# Patient Record
Sex: MALE | Race: WHITE | NOT HISPANIC OR LATINO | Employment: FULL TIME | URBAN - METROPOLITAN AREA
[De-identification: names, ages, dates, MRNs, and addresses within clinical notes are randomized per-mention and may not be internally consistent; named-entity substitution may affect disease eponyms.]

---

## 2017-01-19 DIAGNOSIS — E78.5 HYPERLIPIDEMIA LDL GOAL <130: Primary | ICD-10-CM

## 2017-01-19 RX ORDER — SIMVASTATIN 20 MG
20 TABLET ORAL AT BEDTIME
Qty: 90 TABLET | Refills: 0 | Status: SHIPPED | OUTPATIENT
Start: 2017-01-19 | End: 2017-01-28

## 2017-01-19 NOTE — TELEPHONE ENCOUNTER
simvastatin (ZOCOR) 20 MG tablet    Last Written Prescription Date: 12/09/15  Last Fill Quantity: 90, 10/01/16 # refills: 3    Last Office Visit with [facility] or [facility] prescribing provider:  Dr. Alegre   Future Office Visit:  Nothing schedule as of this date    CHOLESTEROL   Date Value Ref Range Status   12/14/2015 186 <200 mg/dL Final     HDL CHOLESTEROL   Date Value Ref Range Status   12/14/2015 54 >39 mg/dL Final     LDL CHOLESTEROL CALCULATED   Date Value Ref Range Status   12/14/2015 95 <100 mg/dL Final     Comment:     Desirable:       <100 mg/dl     TRIGLYCERIDES   Date Value Ref Range Status   12/14/2015 186* <150 mg/dL Final     Comment:     Borderline high:  150-199 mg/dl   High:             200-499 mg/dl   Very high:       >499 mg/dl   Fasting specimen       CHOLESTEROL/HDL RATIO   Date Value Ref Range Status   10/02/2014 2.9 0.0 - 5.0 Final     ALT   Date Value Ref Range Status   12/14/2015 38 0 - 70 U/L Final

## 2017-01-19 NOTE — Clinical Note
[facility]  [address] [place], [address]  [phone]        January 19, 2017    Ezekiel Lee                                                                                                                                     [address] [facility] [address]            We recently received a call from your pharmacy requesting a refill of Simvastatin.     A review of your chart indicates that an appointment is required with your provider for yearly physical and fasting labs at visit and med refill-was due 12/9/16. Please call the clinic at [phone] to schedule your appointment.     Taking care of your health is important to us and ongoing visits with your provider are vital to your care.  We look forward to seeing you in the near future.     Sincerely,     [facility]

## 2017-01-28 ENCOUNTER — OFFICE VISIT (OUTPATIENT)
Dept: FAMILY MEDICINE | Facility: CLINIC | Age: 42
End: 2017-01-28
Payer: COMMERCIAL

## 2017-01-28 VITALS
DIASTOLIC BLOOD PRESSURE: 66 MMHG | HEART RATE: 65 BPM | SYSTOLIC BLOOD PRESSURE: 118 MMHG | TEMPERATURE: 98.1 F | WEIGHT: 180.4 LBS | HEIGHT: 69 IN | BODY MASS INDEX: 26.72 KG/M2 | RESPIRATION RATE: 14 BRPM

## 2017-01-28 DIAGNOSIS — R19.7 DIARRHEA, UNSPECIFIED TYPE: ICD-10-CM

## 2017-01-28 DIAGNOSIS — G47.00 INSOMNIA, UNSPECIFIED: ICD-10-CM

## 2017-01-28 DIAGNOSIS — Z83.3 FAMILY HISTORY OF DIABETES MELLITUS: ICD-10-CM

## 2017-01-28 DIAGNOSIS — Z83.49 FAMILY HISTORY OF THYROID DISEASE: ICD-10-CM

## 2017-01-28 DIAGNOSIS — R68.82 DECREASED SEX DRIVE: ICD-10-CM

## 2017-01-28 DIAGNOSIS — G25.81 RESTLESS LEGS SYNDROME (RLS): ICD-10-CM

## 2017-01-28 DIAGNOSIS — K21.9 GASTROESOPHAGEAL REFLUX DISEASE WITHOUT ESOPHAGITIS: ICD-10-CM

## 2017-01-28 DIAGNOSIS — E78.5 HYPERLIPIDEMIA LDL GOAL <130: ICD-10-CM

## 2017-01-28 DIAGNOSIS — Z72.51 HIGH RISK SEXUAL BEHAVIOR: ICD-10-CM

## 2017-01-28 DIAGNOSIS — Z00.00 ROUTINE HISTORY AND PHYSICAL EXAMINATION OF ADULT: Primary | ICD-10-CM

## 2017-01-28 PROCEDURE — 87591 N.GONORRHOEAE DNA AMP PROB: CPT | Performed by: FAMILY MEDICINE

## 2017-01-28 PROCEDURE — 99213 OFFICE O/P EST LOW 20 MIN: CPT | Mod: 25 | Performed by: FAMILY MEDICINE

## 2017-01-28 PROCEDURE — 99396 PREV VISIT EST AGE 40-64: CPT | Performed by: FAMILY MEDICINE

## 2017-01-28 PROCEDURE — 87491 CHLMYD TRACH DNA AMP PROBE: CPT | Performed by: FAMILY MEDICINE

## 2017-01-28 RX ORDER — SIMVASTATIN 20 MG
20 TABLET ORAL AT BEDTIME
Qty: 90 TABLET | Refills: 3 | Status: SHIPPED | OUTPATIENT
Start: 2017-01-28 | End: 2018-02-26

## 2017-01-28 RX ORDER — ZOLPIDEM TARTRATE 12.5 MG/1
12.5 TABLET, FILM COATED, EXTENDED RELEASE ORAL
Qty: 90 TABLET | Refills: 0 | Status: SHIPPED | OUTPATIENT
Start: 2017-01-28 | End: 2018-02-26

## 2017-01-28 RX ORDER — EMTRICITABINE AND TENOFOVIR DISOPROXIL FUMARATE 200; 300 MG/1; MG/1
1 TABLET, FILM COATED ORAL DAILY
Qty: 30 TABLET | Refills: 3 | Status: SHIPPED | OUTPATIENT
Start: 2017-01-28 | End: 2017-12-15

## 2017-01-28 RX ORDER — ZOLPIDEM TARTRATE 10 MG/1
10 TABLET ORAL
Qty: 90 TABLET | Refills: 0 | Status: SHIPPED | OUTPATIENT
Start: 2017-01-28 | End: 2018-02-26

## 2017-01-28 RX ORDER — SIMVASTATIN 20 MG
20 TABLET ORAL AT BEDTIME
Qty: 90 TABLET | Refills: 0 | Status: SHIPPED | OUTPATIENT
Start: 2017-01-28 | End: 2017-01-28

## 2017-01-28 RX ORDER — DIPHENOXYLATE HCL/ATROPINE 2.5-.025MG
2 TABLET ORAL 4 TIMES DAILY PRN
Qty: 30 TABLET | Refills: 0 | Status: SHIPPED | OUTPATIENT
Start: 2017-01-28 | End: 2017-05-31

## 2017-01-28 NOTE — PROGRESS NOTES
SUBJECTIVE:     CC: Ezekiel Lee is an 41 year old male who presents for preventative health visit.     Physical  Annual:     Getting at least 3 servings of Calcium per day::  Yes    Bi-annual eye exam::  Yes    Dental care twice a year::  NO    Sleep apnea or symptoms of sleep apnea::  None    Diet::  Regular (no restrictions)    Frequency of exercise::  4-5 days/week    Duration of exercise::  45-60 minutes    Taking medications regularly::  Yes    Medication side effects::  None, No muscle aches and No significant flushing    Additional concerns today::  YES              Today's PHQ-2 Score:   PHQ-2 ( 1999 [employer]) 1/25/2017   Q1: Little interest or pleasure in doing things -   Q2: Feeling down, depressed or hopeless -   PHQ-2 Score -   Little interest or pleasure in doing things Not at all   Feeling down, depressed or hopeless Not at all   PHQ-2 Score 0       Abuse: Current or Past(Physical, Sexual or Emotional)- No  Do you feel safe in your environment - Yes    Social History   Substance Use Topics     Smoking status: Never Smoker      Smokeless tobacco: Never Used     Alcohol Use: Yes      Comment: RARELY     Rarely    Last PSA: No results found for: PSA    Recent Labs   Lab Test  12/14/15   0832  10/02/14   0821  01/23/12   0918   CHOL  186  141  178   HDL  54  49  56   LDL  95  70  96   TRIG  186*  109  129   CHOLHDLRATIO   --   2.9  3.2   NHDL  132*   --    --        Reviewed orders with patient. Reviewed health maintenance and updated orders accordingly - Yes    All Histories reviewed and updated in Epic.      ROS:  C: NEGATIVE for fever, chills, change in weight  I: NEGATIVE for worrisome rashes, moles or lesions  E: NEGATIVE for vision changes or irritation  ENT: NEGATIVE for ear, mouth and throat problems  R: NEGATIVE for significant cough or SOB  CV: NEGATIVE for chest pain, palpitations or peripheral edema  GI: POSITIVE for gas or bloating and diarrhea bid for the last week - loose and sore bottom -  pepto has not helped   male: negative for dysuria, hematuria, decreased urinary stream, erectile dysfunction, urethral discharge  M: NEGATIVE for significant arthralgias or myalgia  N: NEGATIVE for weakness, dizziness or paresthesias  P: NEGATIVE for changes in mood or affect    SUBJECTIVE:    CC: Ezekiel Lee is a 41 year old male who presents for a physical exam.    HPI: He has no concerns at this time.  His cholesterol has been checked and in the past it was good 14 months ago on his meds.  He has never had colonoscopy in the past.  He has had diarrhea for the last week at least 2 times per day.   It is loose and his bottom is getting sore.   He tried pepto and it is not helping.   He is also noting that his sex drive is decreased.   He is getting  soon and he only is interested about once per week where his partner would like to every day.   He and his partner have an open relationship and he is interested in truvada for HIV prevention.   He would like to price this out and see what the risks are and how often he would need testing.       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     NONSPECIFIC MEDICAL HISTORY 1994     fractured mandible and had it wired shut     Detached retina, right 2012     back corner - no visual loss - found on eye exam       Past Surgical History   Procedure Laterality Date     Surgical history of -   1994     fractured mandible     Surgical history of -   4/2013     left eye lazer surgery for detached retina       Current Outpatient Prescriptions   Medication Sig Dispense Refill     zolpidem (AMBIEN) 10 MG tablet Take 1 tablet (10 mg) by mouth nightly as needed for sleep 90 tablet 0     omeprazole (PRILOSEC) 20 MG CR capsule Take 2 capsules (40 mg) by mouth daily 180 capsule 3     zolpidem (AMBIEN CR) 12.5 MG CR tablet Take 1 tablet (12.5 mg) by mouth nightly as needed 90  "tablet 0     emtricitabine-tenofovir (TRUVADA) 200-300 MG per tablet Take 1 tablet by mouth daily 30 tablet 3     diphenoxylate-atropine (LOMOTIL) 2.5-0.025 MG per tablet Take 2 tablets by mouth 4 times daily as needed for diarrhea 30 tablet 0     simvastatin (ZOCOR) 20 MG tablet Take 1 tablet (20 mg) by mouth At Bedtime at bedtime. 90 tablet 3     Multiple Vitamins-Minerals (MULTIVITAMIN & MINERAL PO)        rOPINIRole (REQUIP) 0.5 MG tablet Take 2 tablets (1 mg) by mouth nightly as needed 90 tablet 1     [DISCONTINUED] simvastatin (ZOCOR) 20 MG tablet Take 1 tablet (20 mg) by mouth At Bedtime at bedtime. 90 tablet 0     [DISCONTINUED] simvastatin (ZOCOR) 20 MG tablet Take 1 tablet (20 mg) by mouth At Bedtime at bedtime. 90 tablet 0       Allergies   Allergen Reactions     No Known Drug Allergies        Social History   Substance Use Topics     Smoking status: Never Smoker      Smokeless tobacco: Never Used     Alcohol Use: Yes      Comment: RARELY       Family History   Problem Relation Age of Onset     Lipids Mother      Lipids Father      Hypertension Maternal Grandmother      Hypertension Maternal Grandfather      C.A.D. Paternal Grandfather      in his 60's     DIABETES Maternal Grandfather      DIABETES Paternal Grandfather      Neurologic Disorder Mother      multiple sclerosis     Genitourinary Problems Father      kidney stone     Thyroid Disease Father      hyperthyroidism     GASTROINTESTINAL DISEASE Maternal Grandmother      diverticulitis                      EXAM:  /66 mmHg  Pulse 65  Temp(Src) 98.1  F (36.7  C) (Oral)  Resp 14  Ht 5' 9\" (1.753 m)  Wt 180 lb 6.4 oz (81.829 kg)  BMI 26.63 kg/m2  GENERAL APPEARANCE: healthy, alert and no distress  EYES: Eyes grossly normal to inspection, fundi benign-no diabetic or hypertensive changes seen and PERRL  HENT: ear canals and TM's normal and nose and mouth without ulcers or lesions  NECK: no adenopathy, no asymmetry, masses, or scars and thyroid " normal to palpation  RESP: lungs clear to auscultation - no rales, rhonchi or wheezes  CV: regular rates and rhythm, normal S1 S2, no S3 or S4 and no murmur, click or rub -  ABDOMEN: soft, nontender, without hepatosplenomegaly or masses and bowel sounds normal  : male: testicles normal without atrophy or masses, no hernias and penis normal without urethral discharge  Rectal/prostate: Not done  MS: extremities normal- no gross deformities noted  SKIN: no suspicious lesions or rashes  NEURO: Normal strength and tone, sensory exam grossly normal, mentation intact and speech normal  PSYCH: mentation appears normal. and affect normal/bright  LYMPHATICS:  No lymphadenopathy  EXTREMITIES: No peripheral edema, clubbing or cyanosis      Assessment:  1. Healthy 41 year old male here for healthcare maintainence issues  2. Diarrhea times one week - likely viral  3. High risk sex - man who has sex with men  4. Low sex drive  5. High cholesterol - under good control  6. gastroesophageal reflux disease - under good control  7. Insomnia with  restless leg syndrome  - under good control  8. Family hx of diabetes and thyroid disease      PLAN:  1. See [facility] orders for labs  2. Will try lomotil  3. The potential side effects of the prescription medication were discussed with the patient.  4. If still diarrhea in one week will contact me and will do stool cx  5. Printed rx for truvada - discussed side effects - will price out - need HIV test now and every 3 months with other STI testing too  6. Refills per [facility]  7.  BMP every 6 months while on truvada  8. Liver once yearly due to statin  9. Recheck with me in 6 months and labs in 3 months            I have discussed with patient the risks, benefits, medications, treatment options and modalities.       Coty Roberson MD  [facility]  Answers for HPI/ROS submitted by the patient on 1/25/2017   PHQ-2 Depressed: Not at all, Not at all  PHQ-2 Score: 0

## 2017-01-28 NOTE — MR AVS SNAPSHOT
After Visit Summary   1/28/2017    Ezekiel Lee    MRN: [id number]           Patient Information     Date Of Birth          1975        Visit Information        Provider Department      1/28/2017 11:30 AM Coty Roberson MD [facility]        Today's Diagnoses     Routine history and physical examination of adult    -  1     Hyperlipidemia LDL goal <130         Insomnia, unspecified         Restless legs syndrome (RLS)         Gastroesophageal reflux disease without esophagitis         Family history of diabetes mellitus         Family history of thyroid disease         Decreased sex drive         High risk sexual behavior         Diarrhea, unspecified type            Follow-ups after your visit        Future tests that were ordered for you today     Open Standing Orders        Priority Remaining Interval Expires Ordered    HIV Antigen Antibody Combo Routine 4/4 every 3 months 1/28/2018 1/28/2017    Anti Treponema Routine 4/4 every 3 months 1/28/2018 1/28/2017    NEISSERIA GONORRHOEA PCR Routine 4/4 every 3 months 1/28/2018 1/28/2017    CHLAMYDIA TRACHOMATIS PCR Routine 4/4 every 3 months 1/28/2018 1/28/2017    Hepatitis B surface antigen Routine 4/4 every 3 months 1/28/2018 1/28/2017          Open Future Orders        Priority Expected Expires Ordered    HIV Antigen Antibody Combo Routine 2/3/2017 4/27/2017 1/28/2017    Hepatitis B surface antigen Routine 2/3/2017 4/27/2017 1/28/2017    Anti Treponema Routine 2/3/2017 4/27/2017 1/28/2017    Lipid Profile with reflex to direct LDL Routine 2/7/2017 1/22/2018 1/28/2017    Comprehensive metabolic panel Routine 2/7/2017 1/22/2018 1/28/2017    Hemoglobin A1c Routine 2/7/2017 1/22/2018 1/28/2017    TSH with free T4 reflex Routine 2/7/2017 1/22/2018 1/28/2017    Testosterone Free and Total Routine 2/7/2017 1/22/2018 1/28/2017            Who to contact     If you have questions or need follow up information about today's clinic visit or  "your schedule please contact [facility] directly at [phone].  Normal or non-critical lab and imaging results will be communicated to you by MyChart, letter or phone within 4 business days after the clinic has received the results. If you do not hear from us within 7 days, please contact the clinic through [employer] or phone. If you have a critical or abnormal lab result, we will notify you by phone as soon as possible.  Submit refill requests through [employer] or call your pharmacy and they will forward the refill request to us. Please allow 3 business days for your refill to be completed.          Additional Information About Your Visit        [employer] Information     [employer] gives you secure access to your electronic health record. If you see a primary care provider, you can also send messages to your care team and make appointments. If you have questions, please call your primary care clinic.  If you do not have a primary care provider, please call [phone] and they will assist you.        Care EveryWhere ID     This is your Care EveryWhere ID. This could be used by other organizations to access your [facility] medical records  [id number]        Your Vitals Were     Pulse Temperature Respirations Height BMI (Body Mass Index)       65 98.1  F (36.7  C) (Oral) 14 5' 9\" (1.753 m) 26.63 kg/m2        Blood Pressure from Last 3 Encounters:   01/28/17 118/66   12/09/15 114/80   06/22/15 112/62    Weight from Last 3 Encounters:   01/28/17 180 lb 6.4 oz (81.829 kg)   12/09/15 172 lb 9.6 oz (78.291 kg)   06/22/15 171 lb 1.6 oz (77.61 kg)              We Performed the Following     CHLAMYDIA TRACHOMATIS PCR     NEISSERIA GONORRHOEA PCR          Today's Medication Changes          These changes are accurate as of: 1/28/17 12:14 PM.  If you have any questions, ask your nurse or doctor.               Start taking these medicines.        Dose/Directions    diphenoxylate-atropine 2.5-0.025 MG per tablet "   Commonly known as:  LOMOTIL   Used for:  Diarrhea, unspecified type   Started by:  Coty Roberson MD        Dose:  2 tablet   Take 2 tablets by mouth 4 times daily as needed for diarrhea   Quantity:  30 tablet   Refills:  0       emtricitabine-tenofovir 200-300 MG per tablet   Commonly known as:  TRUVADA   Used for:  High risk sexual behavior   Started by:  Coty Roberson MD        Dose:  1 tablet   Take 1 tablet by mouth daily   Quantity:  30 tablet   Refills:  3            Where to get your medicines      These medications were sent to [facility] - [place], [place] - [address] [facility]  [address] [facility], [place] [address]     Phone:  [phone]    - omeprazole 20 MG CR capsule  - simvastatin 20 MG tablet      Some of these will need a paper prescription and others can be bought over the counter.  Ask your nurse if you have questions.     Bring a paper prescription for each of these medications    - diphenoxylate-atropine 2.5-0.025 MG per tablet  - emtricitabine-tenofovir 200-300 MG per tablet  - zolpidem 10 MG tablet  - zolpidem 12.5 MG CR tablet             Primary Care Provider Office Phone # Fax #    Coty Roberson [phone]       [facility] [id number] [place] [address]        Thank you!     Thank you for choosing [facility]  for your care. Our goal is always to provide you with excellent care. Hearing back from our patients is one way we can continue to improve our services. Please take a few minutes to complete the written survey that you may receive in the mail after your visit with us. Thank you!             Your Updated Medication List - Protect others around you: Learn how to safely use, store and throw away your medicines at www.disposemymeds.org.          This list is accurate as of: 1/28/17 12:14 PM.  Always use your most recent med list.                   Brand Name Dispense Instructions for use     diphenoxylate-atropine 2.5-0.025 MG per tablet    LOMOTIL    30 tablet    Take 2 tablets by mouth 4 times daily as needed for diarrhea       emtricitabine-tenofovir 200-300 MG per tablet    TRUVADA    30 tablet    Take 1 tablet by mouth daily       MULTIVITAMIN & MINERAL PO          omeprazole 20 MG CR capsule    priLOSEC    180 capsule    Take 2 capsules (40 mg) by mouth daily       rOPINIRole 0.5 MG tablet    REQUIP    90 tablet    Take 2 tablets (1 mg) by mouth nightly as needed       simvastatin 20 MG tablet    ZOCOR    90 tablet    Take 1 tablet (20 mg) by mouth At Bedtime at bedtime.       * zolpidem 10 MG tablet    AMBIEN    90 tablet    Take 1 tablet (10 mg) by mouth nightly as needed for sleep       * zolpidem 12.5 MG CR tablet    AMBIEN CR    90 tablet    Take 1 tablet (12.5 mg) by mouth nightly as needed       * Notice:  This list has 2 medication(s) that are the same as other medications prescribed for you. Read the directions carefully, and ask your doctor or other care provider to review them with you.

## 2017-01-31 LAB
C TRACH DNA SPEC QL NAA+PROBE: NORMAL
N GONORRHOEA DNA SPEC QL NAA+PROBE: NORMAL
SPECIMEN SOURCE: NORMAL
SPECIMEN SOURCE: NORMAL

## 2017-02-09 DIAGNOSIS — R94.4 DECREASED GFR: Primary | ICD-10-CM

## 2017-02-09 DIAGNOSIS — R68.82 DECREASED SEX DRIVE: ICD-10-CM

## 2017-02-09 DIAGNOSIS — E78.5 HYPERLIPIDEMIA LDL GOAL <130: ICD-10-CM

## 2017-02-09 DIAGNOSIS — Z83.3 FAMILY HISTORY OF DIABETES MELLITUS: ICD-10-CM

## 2017-02-09 DIAGNOSIS — Z72.51 HIGH RISK SEXUAL BEHAVIOR: ICD-10-CM

## 2017-02-09 DIAGNOSIS — Z00.00 ROUTINE HISTORY AND PHYSICAL EXAMINATION OF ADULT: ICD-10-CM

## 2017-02-09 DIAGNOSIS — Z83.49 FAMILY HISTORY OF THYROID DISEASE: ICD-10-CM

## 2017-02-09 LAB
ALBUMIN SERPL-MCNC: 4.2 G/DL (ref 3.4–5)
ALP SERPL-CCNC: 64 U/L (ref 40–150)
ALT SERPL W P-5'-P-CCNC: 35 U/L (ref 0–70)
ANION GAP SERPL CALCULATED.3IONS-SCNC: 9 MMOL/L (ref 3–14)
AST SERPL W P-5'-P-CCNC: 18 U/L (ref 0–45)
BILIRUB SERPL-MCNC: 0.6 MG/DL (ref 0.2–1.3)
BUN SERPL-MCNC: 14 MG/DL (ref 7–30)
CALCIUM SERPL-MCNC: 9.1 MG/DL (ref 8.5–10.1)
CHLORIDE SERPL-SCNC: 104 MMOL/L (ref 94–109)
CHOLEST SERPL-MCNC: 155 MG/DL
CO2 SERPL-SCNC: 27 MMOL/L (ref 20–32)
CREAT SERPL-MCNC: 1.32 MG/DL (ref 0.66–1.25)
GFR SERPL CREATININE-BSD FRML MDRD: 60 ML/MIN/1.7M2
GLUCOSE SERPL-MCNC: 108 MG/DL (ref 70–99)
HBA1C MFR BLD: 5.2 % (ref 4.3–6)
HBV SURFACE AG SERPL QL IA: NONREACTIVE
HDLC SERPL-MCNC: 55 MG/DL
HIV 1+2 AB+HIV1 P24 AG SERPL QL IA: NORMAL
LDLC SERPL CALC-MCNC: 80 MG/DL
NONHDLC SERPL-MCNC: 100 MG/DL
POTASSIUM SERPL-SCNC: 4.2 MMOL/L (ref 3.4–5.3)
PROT SERPL-MCNC: 7.9 G/DL (ref 6.8–8.8)
SODIUM SERPL-SCNC: 140 MMOL/L (ref 133–144)
TRIGL SERPL-MCNC: 102 MG/DL
TSH SERPL DL<=0.005 MIU/L-ACNC: 0.69 MU/L (ref 0.4–4)

## 2017-02-09 PROCEDURE — 87389 HIV-1 AG W/HIV-1&-2 AB AG IA: CPT | Performed by: FAMILY MEDICINE

## 2017-02-09 PROCEDURE — 80053 COMPREHEN METABOLIC PANEL: CPT | Performed by: FAMILY MEDICINE

## 2017-02-09 PROCEDURE — 36415 COLL VENOUS BLD VENIPUNCTURE: CPT | Performed by: FAMILY MEDICINE

## 2017-02-09 PROCEDURE — 84443 ASSAY THYROID STIM HORMONE: CPT | Performed by: FAMILY MEDICINE

## 2017-02-09 PROCEDURE — 80061 LIPID PANEL: CPT | Performed by: FAMILY MEDICINE

## 2017-02-09 PROCEDURE — 84403 ASSAY OF TOTAL TESTOSTERONE: CPT | Performed by: FAMILY MEDICINE

## 2017-02-09 PROCEDURE — 83036 HEMOGLOBIN GLYCOSYLATED A1C: CPT | Performed by: FAMILY MEDICINE

## 2017-02-09 PROCEDURE — 86780 TREPONEMA PALLIDUM: CPT | Performed by: FAMILY MEDICINE

## 2017-02-09 PROCEDURE — 84270 ASSAY OF SEX HORMONE GLOBUL: CPT | Performed by: FAMILY MEDICINE

## 2017-02-09 PROCEDURE — 87340 HEPATITIS B SURFACE AG IA: CPT | Performed by: FAMILY MEDICINE

## 2017-02-09 NOTE — LETTER
[facility]  [address] [place], [place], [address]  [phone]      February 13, 2017    Ezekiel Lee  [address] [place] [address]          Dear Ezekiel,    The results of your recent tests are back. All labs are normal/negative.   One kidney test is slightly sluggish but can be affected by muscle mass and could be false value.   Come in for repeat testing in 3 months. Enclosed is a copy of the results.  It was a pleasure to see you at your last appointment.  Results for orders placed or performed in visit on 02/09/17   Lipid Profile with reflex to direct LDL   Result Value Ref Range    Cholesterol 155 <200 mg/dL    Triglycerides 102 <150 mg/dL    HDL Cholesterol 55 >39 mg/dL    LDL Cholesterol Calculated 80 <100 mg/dL    Non HDL Cholesterol 100 <130 mg/dL   Comprehensive metabolic panel   Result Value Ref Range    Sodium 140 133 - 144 mmol/L    Potassium 4.2 3.4 - 5.3 mmol/L    Chloride 104 94 - 109 mmol/L    Carbon Dioxide 27 20 - 32 mmol/L    Anion Gap 9 3 - 14 mmol/L    Glucose 108 (H) 70 - 99 mg/dL    Urea Nitrogen 14 7 - 30 mg/dL    Creatinine 1.32 (H) 0.66 - 1.25 mg/dL    GFR Estimate 60 (L) >60 mL/min/1.7m2    GFR Estimate If Black 72 >60 mL/min/1.7m2    Calcium 9.1 8.5 - 10.1 mg/dL    Bilirubin Total 0.6 0.2 - 1.3 mg/dL    Albumin 4.2 3.4 - 5.0 g/dL    Protein Total 7.9 6.8 - 8.8 g/dL    Alkaline Phosphatase 64 40 - 150 U/L    ALT 35 0 - 70 U/L    AST 18 0 - 45 U/L   Hemoglobin A1c   Result Value Ref Range    Hemoglobin A1C 5.2 4.3 - 6.0 %   TSH with free T4 reflex   Result Value Ref Range    TSH 0.69 0.40 - 4.00 mU/L   Testosterone Free and Total   Result Value Ref Range    Testosterone Total 488 240 - 950 ng/dL    Sex Hormone Binding Globulin 52 11 - 80 nmol/L    Free Testosterone Calculated 7.54 4.7 - 24.4 ng/dL   HIV Antigen Antibody Combo   Result Value Ref Range    HIV Antigen Antibody Combo  NR     Nonreactive   HIV-1 p24 Ag & HIV-1/HIV-2 Ab Not Detected     Hepatitis B  surface antigen   Result Value Ref Range    Hep B Surface Agn Nonreactive NR   Anti Treponema   Result Value Ref Range    Treponema pallidum Antibody Negative NEG         If you have any questions or concerns, please call myself or my nurse at [phone].          Sincerely,    Coty Liu MD  [id number]

## 2017-02-10 LAB
SHBG SERPL-SCNC: 52 NMOL/L (ref 11–80)
TESTOST FREE SERPL-MCNC: 7.54 NG/DL (ref 4.7–24.4)
TESTOST SERPL-MCNC: 488 NG/DL (ref 240–950)

## 2017-02-11 LAB — T PALLIDUM IGG+IGM SER QL: NEGATIVE

## 2017-02-13 ENCOUNTER — MYC MEDICAL ADVICE (OUTPATIENT)
Dept: FAMILY MEDICINE | Facility: CLINIC | Age: 42
End: 2017-02-13

## 2017-02-13 NOTE — TELEPHONE ENCOUNTER
Any concerns of fasting glucose?   (108)    Pt. Concerned of T-levels and would like to discuss medication to increase this.     Please see [employer] message.     Althea Lyons RN, BSN, PHN

## 2017-05-31 ENCOUNTER — OFFICE VISIT (OUTPATIENT)
Dept: FAMILY MEDICINE | Facility: CLINIC | Age: 42
End: 2017-05-31
Payer: COMMERCIAL

## 2017-05-31 VITALS
TEMPERATURE: 98.3 F | BODY MASS INDEX: 27.7 KG/M2 | RESPIRATION RATE: 16 BRPM | OXYGEN SATURATION: 97 % | WEIGHT: 187 LBS | DIASTOLIC BLOOD PRESSURE: 74 MMHG | SYSTOLIC BLOOD PRESSURE: 118 MMHG | HEART RATE: 78 BPM | HEIGHT: 69 IN

## 2017-05-31 DIAGNOSIS — R09.A2 GLOBUS PHARYNGEUS: Primary | ICD-10-CM

## 2017-05-31 PROCEDURE — 99213 OFFICE O/P EST LOW 20 MIN: CPT | Performed by: PHYSICIAN ASSISTANT

## 2017-05-31 RX ORDER — FLUTICASONE PROPIONATE 50 MCG
1-2 SPRAY, SUSPENSION (ML) NASAL DAILY
Qty: 1 BOTTLE | Refills: 11 | Status: SHIPPED | OUTPATIENT
Start: 2017-05-31 | End: 2018-06-25

## 2017-05-31 NOTE — MR AVS SNAPSHOT
"              After Visit Summary   5/31/2017    Ezekiel Lee    MRN: [id number]           Patient Information     Date Of Birth          1975        Visit Information        Provider Department      5/31/2017 1:30 PM Iva Edward PA-C [facility]        Today's Diagnoses     Globus pharyngeus    -  1       Follow-ups after your visit        Who to contact     If you have questions or need follow up information about today's clinic visit or your schedule please contact [facility] directly at [phone].  Normal or non-critical lab and imaging results will be communicated to you by NuMediihart, letter or phone within 4 business days after the clinic has received the results. If you do not hear from us within 7 days, please contact the clinic through [employer] or phone. If you have a critical or abnormal lab result, we will notify you by phone as soon as possible.  Submit refill requests through [employer] or call your pharmacy and they will forward the refill request to us. Please allow 3 business days for your refill to be completed.          Additional Information About Your Visit        MyChart Information     [employer] gives you secure access to your electronic health record. If you see a primary care provider, you can also send messages to your care team and make appointments. If you have questions, please call your primary care clinic.  If you do not have a primary care provider, please call [phone] and they will assist you.        Care EveryWhere ID     This is your Care EveryWhere ID. This could be used by other organizations to access your [facility] medical records  [id number]        Your Vitals Were     Pulse Temperature Respirations Height Pulse Oximetry BMI (Body Mass Index)    78 98.3  F (36.8  C) (Oral) 16 5' 9\" (1.753 m) 97% 27.62 kg/m2       Blood Pressure from Last 3 Encounters:   05/31/17 118/74   01/28/17 118/66   12/09/15 114/80    Weight from " Last 3 Encounters:   05/31/17 187 lb (84.8 kg)   01/28/17 180 lb 6.4 oz (81.8 kg)   12/09/15 172 lb 9.6 oz (78.3 kg)              Today, you had the following     No orders found for display         Today's Medication Changes          These changes are accurate as of: 5/31/17 11:59 PM.  If you have any questions, ask your nurse or doctor.               Start taking these medicines.        Dose/Directions    fluticasone 50 MCG/ACT spray   Commonly known as:  FLONASE   Used for:  Globus pharyngeus   Started by:  Iva Edward PA-C        Dose:  1-2 spray   Spray 1-2 sprays into both nostrils daily   Quantity:  1 Bottle   Refills:  11       ranitidine 300 MG tablet   Commonly known as:  ZANTAC   Used for:  Globus pharyngeus   Started by:  Iva Edward PA-C        Dose:  300 mg   Take 1 tablet (300 mg) by mouth At Bedtime   Quantity:  30 tablet   Refills:  1         Stop taking these medicines if you haven't already. Please contact your care team if you have questions.     diphenoxylate-atropine 2.5-0.025 MG per tablet   Commonly known as:  LOMOTIL   Stopped by:  Iva Edward PA-C                Where to get your medicines      [facility] [address] [place] [address] - [address] [facility] AT [facility]  [address] [facility], [facility] [address]     Phone:  [phone]     fluticasone 50 MCG/ACT spray    ranitidine 300 MG tablet                Primary Care Provider Office Phone # Fax #    Coty Roberson [phone]       [facility] [address] [facility] [address]        Thank you!     Thank you for choosing [facility]  for your care. Our goal is always to provide you with excellent care. Hearing back from our patients is one way we can continue to improve our services. Please take a few minutes to complete the written survey that you may receive in the mail after your visit with us. Thank you!              Your Updated Medication List - Protect others around you: Learn how to safely use, store and throw away your medicines at www.disposemymeds.org.          This list is accurate as of: 5/31/17 11:59 PM.  Always use your most recent med list.                   Brand Name Dispense Instructions for use    emtricitabine-tenofovir 200-300 MG per tablet    TRUVADA    30 tablet    Take 1 tablet by mouth daily       fluticasone 50 MCG/ACT spray    FLONASE    1 Bottle    Spray 1-2 sprays into both nostrils daily       MULTIVITAMIN & MINERAL PO          omeprazole 20 MG CR capsule    priLOSEC    180 capsule    Take 2 capsules (40 mg) by mouth daily       ranitidine 300 MG tablet    ZANTAC    30 tablet    Take 1 tablet (300 mg) by mouth At Bedtime       rOPINIRole 0.5 MG tablet    REQUIP    90 tablet    Take 2 tablets (1 mg) by mouth nightly as needed       simvastatin 20 MG tablet    ZOCOR    90 tablet    Take 1 tablet (20 mg) by mouth At Bedtime at bedtime.       * zolpidem 10 MG tablet    AMBIEN    90 tablet    Take 1 tablet (10 mg) by mouth nightly as needed for sleep       * zolpidem 12.5 MG CR tablet    AMBIEN CR    90 tablet    Take 1 tablet (12.5 mg) by mouth nightly as needed       * Notice:  This list has 2 medication(s) that are the same as other medications prescribed for you. Read the directions carefully, and ask your doctor or other care provider to review them with you.

## 2017-05-31 NOTE — PROGRESS NOTES
"  SUBJECTIVE:                                                    Ezekiel Lee is a 42 year old very pleasant male who presents to clinic today for the following health issues:    Patient states that he has had a sore throat for the past 3 weeks. Patient states that he as also had episodes of trouble swallowing. Patient states symptoms have been intermittent. States \"my throat does not feel like strep, which is usually sharp feeling.\"  Has intermittent \"ache\" almost like a \"bruise\" in throat or a sensation of \"fullness\".   Patient does have reflux, which he taking 40 mg of omeprazole for at bedtime.   May have some seasonal allergies.     Of note, pt has not started Truvada yet.     Had TSH checked 3 months ago at physical          Problem list and histories reviewed & adjusted, as indicated.  Additional history: as documented    Patient Active Problem List   Diagnosis     Spasm of muscle     Esophageal reflux     Family history of diabetes mellitus     Allergic rhinitis     Hyperlipidemia LDL goal <130     Restless legs syndrome (RLS)     Past Surgical History:   Procedure Laterality Date     SURGICAL HISTORY OF -   1994    fractured mandible     SURGICAL HISTORY OF -   4/2013    left eye lazer surgery for detached retina       Social History   Substance Use Topics     Smoking status: Never Smoker     Smokeless tobacco: Never Used     Alcohol use Yes      Comment: RARELY     Family History   Problem Relation Age of Onset     Lipids Mother      Lipids Father      Hypertension Maternal Grandmother      Hypertension Maternal Grandfather      C.A.D. Paternal Grandfather      in his 60's     DIABETES Maternal Grandfather      DIABETES Paternal Grandfather      Neurologic Disorder Mother      multiple sclerosis     Genitourinary Problems Father      kidney stone     Thyroid Disease Father      hyperthyroidism     GASTROINTESTINAL DISEASE Maternal Grandmother      diverticulitis           Reviewed and updated as needed " "this visit by clinical staff  Tobacco  Med Hx  Surg Hx  Fam Hx  Soc Hx      Reviewed and updated as needed this visit by Provider         ROS:  Constitutional, HEENT, cardiovascular, pulmonary, GI, , musculoskeletal, neuro, skin, endocrine and psych systems are negative, except as otherwise noted.    OBJECTIVE:                                                    /74 (BP Location: Right arm, Patient Position: Chair, Cuff Size: Adult Large)  Pulse 78  Temp 98.3  F (36.8  C) (Oral)  Resp 16  Ht 5' 9\" (1.753 m)  Wt 187 lb (84.8 kg)  SpO2 97%  BMI 27.62 kg/m2  Body mass index is 27.62 kg/(m^2).  GENERAL APPEARANCE: healthy, alert and no distress  EYES: Eyes grossly normal to inspection, PERRL and conjunctivae and sclerae normal  HENT: ear canals and TM's normal and oral mucous membranes moist, post nasal drip noted  NECK: no adenopathy, no asymmetry, masses, or scars and thyroid normal to palpation  RESP: lungs clear to auscultation - no rales, rhonchi or wheezes  CV: regular rates and rhythm, normal S1 S2, no S3 or S4 and no murmur, click or rub  LYMPHATICS: normal ant/post cervical and supraclavicular nodes  SKIN: no suspicious lesions or rashes  PSYCH: mentation appears normal and affect normal/bright         ASSESSMENT/PLAN:                                                            1. Globus pharyngeus  Trial of zantac at bedtime and try taking omeprazole in the morning  Start Flonase.  If symptoms persist or worsen return to clinic   May consider referral to ENT if needed, informed pt may call.   - ranitidine (ZANTAC) 300 MG tablet; Take 1 tablet (300 mg) by mouth At Bedtime  Dispense: 30 tablet; Refill: 1  - fluticasone (FLONASE) 50 MCG/ACT spray; Spray 1-2 sprays into both nostrils daily  Dispense: 1 Bottle; Refill: 11        Iva Edward PA-C, REG  [facility]"

## 2017-08-01 DIAGNOSIS — R19.7 DIARRHEA, UNSPECIFIED TYPE: Primary | ICD-10-CM

## 2017-08-02 NOTE — TELEPHONE ENCOUNTER
DIPHENOXYLATE/ATROPINE TABLETS        Last Written Prescription Date: 01/28/17  Last Fill Quantity: 30,  # refills: 0   Last Office Visit with G, P or [facility] prescribing provider: 05/31/17 EVAN Roberson

## 2017-08-03 RX ORDER — DIPHENOXYLATE HCL/ATROPINE 2.5-.025MG
TABLET ORAL
Qty: 30 TABLET | Refills: 0 | Status: SHIPPED | OUTPATIENT
Start: 2017-08-03 | End: 2019-01-24

## 2017-08-03 NOTE — TELEPHONE ENCOUNTER
[facility] [address] [address] [facility] [address] [place] [phone]. Ruth Behrens.

## 2017-08-03 NOTE — TELEPHONE ENCOUNTER
ANTONIO'd 5/31/17 by rooming nurse.  Due for 6 month visit.  Sent to provider.  Please advise.  Jojo Epstein, RN    1/28/17  Assessment:  1. Healthy 41 year old male here for healthcare maintainence issues  2. Diarrhea times one week - likely viral  3. High risk sex - man who has sex with men  4. Low sex drive  5. High cholesterol - under good control  6. gastroesophageal reflux disease - under good control  7. Insomnia with  restless leg syndrome  - under good control  8. Family hx of diabetes and thyroid disease        PLAN:  [facility]  2. Will try lomotil  3. The potential side effects of the prescription medication were discussed with the patient.  4. If still diarrhea in one week will contact me and will do stool cx  5. Printed rx for truvada - discussed side effects - will price out - need HIV test now and every 3 months with other STI testing too  6. Refills per [facility]  7.  BMP every 6 months while on truvada  8. Liver once yearly due to statin  9. Recheck with me in 6 months and labs in 3 months

## 2017-08-04 NOTE — TELEPHONE ENCOUNTER
Got fax from  that Lomotil does not need PA if less than 8/d case #[id number].  Not sure if pharmacy did PA.  Noting in chart in case question on this.  Jojo Epstein RN

## 2017-11-06 ENCOUNTER — MYC REFILL (OUTPATIENT)
Dept: FAMILY MEDICINE | Facility: CLINIC | Age: 42
End: 2017-11-06

## 2017-11-06 DIAGNOSIS — G25.81 RESTLESS LEGS SYNDROME (RLS): Primary | ICD-10-CM

## 2017-11-07 RX ORDER — ROPINIROLE 0.5 MG/1
1 TABLET, FILM COATED ORAL
Qty: 90 TABLET | Refills: 1 | Status: SHIPPED | OUTPATIENT
Start: 2017-11-07 | End: 2017-11-13

## 2017-11-07 NOTE — TELEPHONE ENCOUNTER
Message from Paper.li:  Original authorizing provider: MD Elan Cabaemy Rosa would like a refill of the following medications:  rOPINIRole (REQUIP) 0.5 MG tablet [Coty Roberson MD]    Preferred pharmacy: [facility] - [place] - [address]    Comment:

## 2017-11-07 NOTE — TELEPHONE ENCOUNTER
Requip     Last Written Prescription Date: 6/30/2014  Last Fill Quantity: 90, # refills: 1  Last Office Visit with [facility], P or [facility] prescribing provider: 5/31/2017        BP Readings from Last 3 Encounters:   05/31/17 118/74   01/28/17 118/66   12/09/15 114/80     Routing refill request to provider for review/approval because:  A break in medication. Please approve if appropriate.    Althea OLVERA RN, BSN, PHN  [facility]

## 2017-11-11 ENCOUNTER — MYC MEDICAL ADVICE (OUTPATIENT)
Dept: FAMILY MEDICINE | Facility: CLINIC | Age: 42
End: 2017-11-11

## 2017-11-13 RX ORDER — ROPINIROLE 0.5 MG/1
1 TABLET, FILM COATED ORAL
Qty: 180 TABLET | Refills: 1 | Status: SHIPPED | OUTPATIENT
Start: 2017-11-13 | End: 2018-10-21

## 2017-12-15 ENCOUNTER — MYC REFILL (OUTPATIENT)
Dept: FAMILY MEDICINE | Facility: CLINIC | Age: 42
End: 2017-12-15

## 2017-12-15 DIAGNOSIS — Z72.51 HIGH RISK SEXUAL BEHAVIOR: ICD-10-CM

## 2017-12-18 RX ORDER — EMTRICITABINE AND TENOFOVIR DISOPROXIL FUMARATE 200; 300 MG/1; MG/1
1 TABLET, FILM COATED ORAL DAILY
Qty: 30 TABLET | Refills: 3 | Status: SHIPPED | OUTPATIENT
Start: 2017-12-18 | End: 2019-01-07

## 2017-12-18 NOTE — TELEPHONE ENCOUNTER
Message from Sookasat:  Original authorizing provider: MD Ezekiel Caba would like a refill of the following medications:  emtricitabine-tenofovir (TRUVADA) 200-300 MG per tablet [Coty Roberson MD]    Preferred pharmacy: [facility] [id number] [place] [address], [place] - [address] [facility] AT [facility]    Comment:  Could you please send this prescription to the [facility] off [address]? Thank you!

## 2017-12-18 NOTE — TELEPHONE ENCOUNTER
We also need lab work every 3 month while taking and no lab work since February.   Please have him come in for standing order labs and then we can renew the med

## 2017-12-18 NOTE — TELEPHONE ENCOUNTER
Pt notified of message and is scheduled for a lab only tomorrow at 4pm    Ame Christianson/JOHN  [facility]

## 2017-12-18 NOTE — TELEPHONE ENCOUNTER
Routing refill request to provider for review/approval because:  Patient not taking: Reported on 6/1/2017  Vick Segovia RN

## 2017-12-19 DIAGNOSIS — Z72.51 HIGH RISK SEXUAL BEHAVIOR: ICD-10-CM

## 2017-12-19 PROCEDURE — 87491 CHLMYD TRACH DNA AMP PROBE: CPT | Performed by: FAMILY MEDICINE

## 2017-12-19 PROCEDURE — 36415 COLL VENOUS BLD VENIPUNCTURE: CPT | Performed by: FAMILY MEDICINE

## 2017-12-19 PROCEDURE — 87340 HEPATITIS B SURFACE AG IA: CPT | Performed by: FAMILY MEDICINE

## 2017-12-19 PROCEDURE — 87389 HIV-1 AG W/HIV-1&-2 AB AG IA: CPT | Performed by: FAMILY MEDICINE

## 2017-12-19 PROCEDURE — 87591 N.GONORRHOEAE DNA AMP PROB: CPT | Performed by: FAMILY MEDICINE

## 2017-12-19 PROCEDURE — 86780 TREPONEMA PALLIDUM: CPT | Performed by: FAMILY MEDICINE

## 2017-12-21 LAB
C TRACH DNA SPEC QL NAA+PROBE: NEGATIVE
HBV SURFACE AG SERPL QL IA: NONREACTIVE
HIV 1+2 AB+HIV1 P24 AG SERPL QL IA: NONREACTIVE
N GONORRHOEA DNA SPEC QL NAA+PROBE: NEGATIVE
SPECIMEN SOURCE: NORMAL
SPECIMEN SOURCE: NORMAL
T PALLIDUM IGG+IGM SER QL: NEGATIVE

## 2018-02-26 ENCOUNTER — OFFICE VISIT (OUTPATIENT)
Dept: FAMILY MEDICINE | Facility: CLINIC | Age: 43
End: 2018-02-26
Payer: COMMERCIAL

## 2018-02-26 VITALS
DIASTOLIC BLOOD PRESSURE: 70 MMHG | TEMPERATURE: 97.7 F | WEIGHT: 181 LBS | HEIGHT: 69 IN | OXYGEN SATURATION: 98 % | RESPIRATION RATE: 14 BRPM | BODY MASS INDEX: 26.81 KG/M2 | SYSTOLIC BLOOD PRESSURE: 118 MMHG | HEART RATE: 72 BPM

## 2018-02-26 DIAGNOSIS — E78.5 HYPERLIPIDEMIA LDL GOAL <130: ICD-10-CM

## 2018-02-26 DIAGNOSIS — Z11.3 SCREEN FOR STD (SEXUALLY TRANSMITTED DISEASE): ICD-10-CM

## 2018-02-26 DIAGNOSIS — D17.30 LIPOMA OF SKIN AND SUBCUTANEOUS TISSUE: ICD-10-CM

## 2018-02-26 DIAGNOSIS — Z00.00 ROUTINE HISTORY AND PHYSICAL EXAMINATION OF ADULT: Primary | ICD-10-CM

## 2018-02-26 DIAGNOSIS — G25.81 RESTLESS LEGS SYNDROME (RLS): ICD-10-CM

## 2018-02-26 DIAGNOSIS — K21.9 GASTROESOPHAGEAL REFLUX DISEASE WITHOUT ESOPHAGITIS: ICD-10-CM

## 2018-02-26 LAB
ALBUMIN SERPL-MCNC: 4.3 G/DL (ref 3.4–5)
ALP SERPL-CCNC: 61 U/L (ref 40–150)
ALT SERPL W P-5'-P-CCNC: 27 U/L (ref 0–70)
ANION GAP SERPL CALCULATED.3IONS-SCNC: 5 MMOL/L (ref 3–14)
AST SERPL W P-5'-P-CCNC: 23 U/L (ref 0–45)
BILIRUB SERPL-MCNC: 0.7 MG/DL (ref 0.2–1.3)
BUN SERPL-MCNC: 20 MG/DL (ref 7–30)
CALCIUM SERPL-MCNC: 9.2 MG/DL (ref 8.5–10.1)
CHLORIDE SERPL-SCNC: 103 MMOL/L (ref 94–109)
CHOLEST SERPL-MCNC: 161 MG/DL
CO2 SERPL-SCNC: 29 MMOL/L (ref 20–32)
CREAT SERPL-MCNC: 1.44 MG/DL (ref 0.66–1.25)
GFR SERPL CREATININE-BSD FRML MDRD: 54 ML/MIN/1.7M2
GLUCOSE SERPL-MCNC: 92 MG/DL (ref 70–99)
HDLC SERPL-MCNC: 51 MG/DL
LDLC SERPL CALC-MCNC: 92 MG/DL
NONHDLC SERPL-MCNC: 110 MG/DL
POTASSIUM SERPL-SCNC: 4.5 MMOL/L (ref 3.4–5.3)
PROT SERPL-MCNC: 7.8 G/DL (ref 6.8–8.8)
SODIUM SERPL-SCNC: 137 MMOL/L (ref 133–144)
TRIGL SERPL-MCNC: 91 MG/DL

## 2018-02-26 PROCEDURE — 80061 LIPID PANEL: CPT | Performed by: FAMILY MEDICINE

## 2018-02-26 PROCEDURE — 87591 N.GONORRHOEAE DNA AMP PROB: CPT | Performed by: FAMILY MEDICINE

## 2018-02-26 PROCEDURE — 99396 PREV VISIT EST AGE 40-64: CPT | Performed by: FAMILY MEDICINE

## 2018-02-26 PROCEDURE — 87389 HIV-1 AG W/HIV-1&-2 AB AG IA: CPT | Performed by: FAMILY MEDICINE

## 2018-02-26 PROCEDURE — 80053 COMPREHEN METABOLIC PANEL: CPT | Performed by: FAMILY MEDICINE

## 2018-02-26 PROCEDURE — 86780 TREPONEMA PALLIDUM: CPT | Performed by: FAMILY MEDICINE

## 2018-02-26 PROCEDURE — 87491 CHLMYD TRACH DNA AMP PROBE: CPT | Performed by: FAMILY MEDICINE

## 2018-02-26 PROCEDURE — 36415 COLL VENOUS BLD VENIPUNCTURE: CPT | Performed by: FAMILY MEDICINE

## 2018-02-26 RX ORDER — ZOLPIDEM TARTRATE 10 MG/1
10 TABLET ORAL
Qty: 90 TABLET | Refills: 0 | Status: SHIPPED | OUTPATIENT
Start: 2018-02-26 | End: 2020-07-15

## 2018-02-26 RX ORDER — ZOLPIDEM TARTRATE 12.5 MG/1
12.5 TABLET, FILM COATED, EXTENDED RELEASE ORAL
Qty: 90 TABLET | Refills: 0 | Status: SHIPPED | OUTPATIENT
Start: 2018-02-26 | End: 2020-07-15

## 2018-02-26 RX ORDER — SIMVASTATIN 20 MG
20 TABLET ORAL AT BEDTIME
Qty: 90 TABLET | Refills: 3 | Status: SHIPPED | OUTPATIENT
Start: 2018-02-26 | End: 2018-04-26

## 2018-02-26 NOTE — NURSING NOTE
"Chief Complaint   Patient presents with     Physical     lump left arm, restless legs        Initial /70 (BP Location: Right arm, Patient Position: Chair, Cuff Size: Adult Regular)  Pulse 72  Temp 97.7  F (36.5  C) (Oral)  Resp 14  Ht 5' 9\" (1.753 m)  Wt 181 lb (82.1 kg)  SpO2 98%  BMI 26.73 kg/m2 Estimated body mass index is 26.73 kg/(m^2) as calculated from the following:    Height as of this encounter: 5' 9\" (1.753 m).    Weight as of this encounter: 181 lb (82.1 kg).  Medication Reconciliation: complete   "

## 2018-02-26 NOTE — MR AVS SNAPSHOT
After Visit Summary   2/26/2018    Ezekiel Lee    MRN: [id number]           Patient Information     Date Of Birth          1975        Visit Information        Provider Department      2/26/2018 10:45 AM Coty Roberson MD [facility]        Today's Diagnoses     Routine history and physical examination of adult    -  1    Restless legs syndrome (RLS)        Hyperlipidemia LDL goal <130        Gastroesophageal reflux disease without esophagitis        Screen for STD (sexually transmitted disease)          Care Instructions      Preventive Health Recommendations  Male Ages 40 to 49    Yearly exam:             See your health care provider every year in order to  o   Review health changes.   o   Discuss preventive care.    o   Review your medicines if your doctor has prescribed any.    You should be tested each year for STDs (sexually transmitted diseases) if you re at risk.     Have a cholesterol test every 5 years.     Have a colonoscopy (test for colon cancer) if someone in your family has had colon cancer or polyps before age 50.     After age 45, have a diabetes test (fasting glucose). If you are at risk for diabetes, you should have this test every 3 years.      Talk with your health care provider about whether or not a prostate cancer screening test (PSA) is right for you.    Shots: Get a flu shot each year. Get a tetanus shot every 10 years.     Nutrition:    Eat at least 5 servings of fruits and vegetables daily.     Eat whole-grain bread, whole-wheat pasta and brown rice instead of white grains and rice.     Talk to your provider about Calcium and Vitamin D.     Lifestyle    Exercise for at least 150 minutes a week (30 minutes a day, 5 days a week). This will help you control your weight and prevent disease.     Limit alcohol to one drink per day.     No smoking.     Wear sunscreen to prevent skin cancer.     See your dentist every six months for an exam and cleaning.     "          Follow-ups after your visit        Who to contact     If you have questions or need follow up information about today's clinic visit or your schedule please contact [facility] directly at [phone].  Normal or non-critical lab and imaging results will be communicated to you by MyChart, letter or phone within 4 business days after the clinic has received the results. If you do not hear from us within 7 days, please contact the clinic through MyChart or phone. If you have a critical or abnormal lab result, we will notify you by phone as soon as possible.  Submit refill requests through [employer] or call your pharmacy and they will forward the refill request to us. Please allow 3 business days for your refill to be completed.          Additional Information About Your Visit        EverCloud[employer]Medallia Information     [employer] gives you secure access to your electronic health record. If you see a primary care provider, you can also send messages to your care team and make appointments. If you have questions, please call your primary care clinic.  If you do not have a primary care provider, please call [phone] and they will assist you.        Care EveryWhere ID     This is your Care EveryWhere ID. This could be used by other organizations to access your [facility] medical records  [id number]        Your Vitals Were     Pulse Temperature Respirations Height Pulse Oximetry BMI (Body Mass Index)    72 97.7  F (36.5  C) (Oral) 14 5' 9\" (1.753 m) 98% 26.73 kg/m2       Blood Pressure from Last 3 Encounters:   02/26/18 118/70   05/31/17 118/74   01/28/17 118/66    Weight from Last 3 Encounters:   02/26/18 181 lb (82.1 kg)   05/31/17 187 lb (84.8 kg)   01/28/17 180 lb 6.4 oz (81.8 kg)              We Performed the Following     Anti Treponema     CHLAMYDIA TRACHOMATIS PCR     Comprehensive metabolic panel     HIV Antigen Antibody Combo     Lipid panel reflex to direct LDL Fasting     NEISSERIA GONORRHOEA PCR "          Where to get your medicines      These medications were sent to [facility] [address] [place], [place] - [address] AT [facility] [address]  [address] [address]     Phone:  [phone]     omeprazole 20 MG CR capsule    simvastatin 20 MG tablet         Some of these will need a paper prescription and others can be bought over the counter.  Ask your nurse if you have questions.     Bring a paper prescription for each of these medications     zolpidem 10 MG tablet    zolpidem 12.5 MG CR tablet          Primary Care Provider Office Phone # Fax #    Coty KIRSTIE Roberson [phone] [address] [facility] [address]        Equal Access to Services     JESSICA FREDERICK : Hadii pedro blairo Mike, waaxda luqadaha, qaybta kaalmada yvonneyaaminata, suzanne almeida . So [facility] [phone].    ATENCIÓN: Si habla español, tiene a felton disposición servicios gratuitos de asistencia lingüística. [facility] [phone].    We comply with applicable federal civil rights laws and [place] laws. We do not discriminate on the basis of race, color, national origin, age, disability, sex, sexual orientation, or gender identity.            Thank you!     Thank you for choosing [facility]  for your care. Our goal is always to provide you with excellent care. Hearing back from our patients is one way we can continue to improve our services. Please take a few minutes to complete the written survey that you may receive in the mail after your visit with us. Thank you!             Your Updated Medication List - Protect others around you: Learn how to safely use, store and throw away your medicines at www.disposemymeds.org.          This list is accurate as of 2/26/18 11:21 AM.  Always use your most recent med list.                   Brand Name Dispense Instructions for use Diagnosis    diphenoxylate-atropine 2.5-0.025 MG per tablet    LOMOTIL    30 tablet     TAKE 2 TABLETS BY MOUTH FOUR TIMES DAILY AS NEEDED FOR DIARRHEA    Diarrhea, unspecified type       emtricitabine-tenofovir 200-300 MG per tablet    TRUVADA    30 tablet    Take 1 tablet by mouth daily    High risk sexual behavior       fluticasone 50 MCG/ACT spray    FLONASE    1 Bottle    Spray 1-2 sprays into both nostrils daily    Globus pharyngeus       MULTIVITAMIN & MINERAL PO           omeprazole 20 MG CR capsule    priLOSEC    180 capsule    Take 2 capsules (40 mg) by mouth daily    Gastroesophageal reflux disease without esophagitis       rOPINIRole 0.5 MG tablet    REQUIP    180 tablet    Take 2 tablets (1 mg) by mouth nightly as needed    Restless legs syndrome (RLS)       simvastatin 20 MG tablet    ZOCOR    90 tablet    Take 1 tablet (20 mg) by mouth At Bedtime at bedtime.    Hyperlipidemia LDL goal <130       * zolpidem 12.5 MG CR tablet    AMBIEN CR    90 tablet    Take 1 tablet (12.5 mg) by mouth nightly as needed    Restless legs syndrome (RLS)       * zolpidem 10 MG tablet    AMBIEN    90 tablet    Take 1 tablet (10 mg) by mouth nightly as needed for sleep    Restless legs syndrome (RLS)       * Notice:  This list has 2 medication(s) that are the same as other medications prescribed for you. Read the directions carefully, and ask your doctor or other care provider to review them with you.

## 2018-02-26 NOTE — PROGRESS NOTES
SUBJECTIVE:   CC: Ezekiel Lee is an 43 year old male who presents for preventative health visit.     Physical   Annual:     Getting at least 3 servings of Calcium per day::  Yes    Bi-annual eye exam::  Yes    Dental care twice a year::  NO    Sleep apnea or symptoms of sleep apnea::  None    Diet::  Regular (no restrictions)    Frequency of exercise::  4-5 days/week    Duration of exercise::  45-60 minutes    Taking medications regularly::  Yes    Medication side effects::  Not applicable    Additional concerns today::  YES              Today's PHQ-2 Score:   PHQ-2 ( 1999 [employer]) 2/20/2018   Q1: Little interest or pleasure in doing things 0   Q2: Feeling down, depressed or hopeless 0   PHQ-2 Score 0   Q1: Little interest or pleasure in doing things Not at all   Q2: Feeling down, depressed or hopeless Not at all   PHQ-2 Score 0       Abuse: Current or Past(Physical, Sexual or Emotional)- No  Do you feel safe in your environment - Yes    Social History   Substance Use Topics     Smoking status: Never Smoker     Smokeless tobacco: Never Used     Alcohol use Yes      Comment: RARELY     Alcohol Use 2/20/2018   If you drink alcohol, do you typically have greater than 3 drinks per day OR greater than 7 drinks per week?   No   No flowsheet data found.    Last PSA: No results found for: PSA    Reviewed orders with patient. Reviewed health maintenance and updated orders accordingly - Yes          Review of Systems  C: NEGATIVE for fever, chills, change in weight  I: NEGATIVE for worrisome rashes, moles or lesions  E: NEGATIVE for vision changes or irritation  ENT: NEGATIVE for ear, mouth and throat problems  R: NEGATIVE for significant cough or SOB  CV: NEGATIVE for chest pain, palpitations or peripheral edema  GI: NEGATIVE for nausea, abdominal pain, heartburn, or change in bowel habits   male: negative for dysuria, hematuria, decreased urinary stream, erectile dysfunction, urethral discharge  M: NEGATIVE for  significant arthralgias or myalgia  N: NEGATIVE for weakness, dizziness or paresthesias  P: NEGATIVE for changes in mood or affect      Physical Exam      SUBJECTIVE:    CC: Ezekiel Lee is a 43 year old male who presents for a physical exam.    HPI: He has no concerns at this time.  His cholesterol has been checked and in the past it was good on the medication.  He has never had colonoscopy in the past.  He has small lump in left forearm.   It does not hurt.      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     NONSPECIFIC MEDICAL HISTORY 1994    fractured mandible and had it wired shut     Pure hypercholesterolemia 2001       Past Surgical History:   Procedure Laterality Date     SURGICAL HISTORY OF -   1994    fractured mandible     SURGICAL HISTORY OF -   4/2013    left eye lazer surgery for detached retina       Current Outpatient Prescriptions   Medication Sig Dispense Refill     zolpidem (AMBIEN CR) 12.5 MG CR tablet Take 1 tablet (12.5 mg) by mouth nightly as needed 90 tablet 0     zolpidem (AMBIEN) 10 MG tablet Take 1 tablet (10 mg) by mouth nightly as needed for sleep 90 tablet 0     simvastatin (ZOCOR) 20 MG tablet Take 1 tablet (20 mg) by mouth At Bedtime at bedtime. 90 tablet 3     omeprazole (PRILOSEC) 20 MG CR capsule Take 2 capsules (40 mg) by mouth daily 180 capsule 3     emtricitabine-tenofovir (TRUVADA) 200-300 MG per tablet Take 1 tablet by mouth daily 30 tablet 3     rOPINIRole (REQUIP) 0.5 MG tablet Take 2 tablets (1 mg) by mouth nightly as needed 180 tablet 1     diphenoxylate-atropine (LOMOTIL) 2.5-0.025 MG per tablet TAKE 2 TABLETS BY MOUTH FOUR TIMES DAILY AS NEEDED FOR DIARRHEA 30 tablet 0     fluticasone (FLONASE) 50 MCG/ACT spray Spray 1-2 sprays into both nostrils daily 1 Bottle 11     Multiple Vitamins-Minerals (MULTIVITAMIN & MINERAL PO)    "     [DISCONTINUED] simvastatin (ZOCOR) 20 MG tablet Take 1 tablet (20 mg) by mouth At Bedtime at bedtime. 90 tablet 3       Allergies   Allergen Reactions     No Known Drug Allergies        Social History   Substance Use Topics     Smoking status: Never Smoker     Smokeless tobacco: Never Used     Alcohol use Yes      Comment: RARELY       Family History   Problem Relation Age of Onset     Lipids Mother      Neurologic Disorder Mother      multiple sclerosis     Lipids Father      Genitourinary Problems Father      kidney stone     Thyroid Disease Father      hyperthyroidism     Hypertension Maternal Grandmother      GASTROINTESTINAL DISEASE Maternal Grandmother      diverticulitis     Hypertension Maternal Grandfather      DIABETES Maternal Grandfather      C.A.D. Paternal Grandfather      in his 60's     DIABETES Paternal Grandfather                       EXAM:  /70 (BP Location: Right arm, Patient Position: Chair, Cuff Size: Adult Regular)  Pulse 72  Temp 97.7  F (36.5  C) (Oral)  Resp 14  Ht 5' 9\" (1.753 m)  Wt 181 lb (82.1 kg)  SpO2 98%  BMI 26.73 kg/m2  GENERAL APPEARANCE: healthy, alert and no distress  EYES: Eyes grossly normal to inspection, fundi benign-no diabetic or hypertensive changes seen and PERRL  HENT: ear canals and TM's normal and nose and mouth without ulcers or lesions  NECK: no adenopathy, no asymmetry, masses, or scars and thyroid normal to palpation  RESP: lungs clear to auscultation - no rales, rhonchi or wheezes  CV: regular rates and rhythm, normal S1 S2, no S3 or S4 and no murmur, click or rub -  ABDOMEN: soft, nontender, without hepatosplenomegaly or masses and bowel sounds normal  : male: testicles normal without atrophy or masses, no hernias and penis normal without urethral discharge  Rectal/prostate: Not done   MS: extremities normal- no gross deformities noted.   Left ventral forearm with jelly bean sized subQ mass noted - soft and mobile  SKIN: no suspicious " "lesions or rashes  NEURO: Normal strength and tone, sensory exam grossly normal, mentation intact and speech normal  PSYCH: mentation appears normal. and affect normal/bright  LYMPHATICS:  No lymphadenopathy  EXTREMITIES: No peripheral edema, clubbing or cyanosis      Assessment:  1. Healthy 43 year old male here for healthcare maintainence issues  2. Restless legs syndrome - increasing in frequency - now 2-4 times per week - doing well with requip for now  3. Lipids - controlled  4. GERD - under good control   5. Lipoma - left forearm - will watch    PLAN:  1. See epicare orders for labs  2. Refills per epicare  3. Continue meds at same doses  4.  Consider topamax for restless legs - discussed but will not do for now  5. The patient is to follow up in 1 year and as needed.          I have discussed with patient the risks, benefits, medications, treatment options and modalities.       COUNSELING:   Reviewed preventive health counseling, as reflected in patient instructions       Regular exercise       Healthy diet/nutrition       Vision screening       Safe sex practices/STD prevention         reports that he has never smoked. He has never used smokeless tobacco.    Estimated body mass index is 27.62 kg/(m^2) as calculated from the following:    Height as of 5/31/17: 5' 9\" (1.753 m).    Weight as of 5/31/17: 187 lb (84.8 kg).       Counseling Resources:  ATP IV Guidelines  Pooled Cohorts Equation Calculator  FRAX Risk Assessment  ICSI Preventive Guidelines  Dietary Guidelines for Americans, 2010  USDA's MyPlate  ASA Prophylaxis  Lung CA Screening    Coty Roberson MD  [facility]  Answers for HPI/ROS submitted by the patient on 2/20/2018   PHQ-2 Score: 0    "

## 2018-02-27 LAB
C TRACH DNA SPEC QL NAA+PROBE: NEGATIVE
HIV 1+2 AB+HIV1 P24 AG SERPL QL IA: NONREACTIVE
N GONORRHOEA DNA SPEC QL NAA+PROBE: NEGATIVE
SPECIMEN SOURCE: NORMAL
SPECIMEN SOURCE: NORMAL
T PALLIDUM IGG+IGM SER QL: NEGATIVE

## 2018-04-20 DIAGNOSIS — E78.5 HYPERLIPIDEMIA LDL GOAL <130: ICD-10-CM

## 2018-04-20 DIAGNOSIS — K21.9 GASTROESOPHAGEAL REFLUX DISEASE WITHOUT ESOPHAGITIS: ICD-10-CM

## 2018-04-23 RX ORDER — SIMVASTATIN 20 MG
TABLET ORAL
Status: SHIPPED
Start: 2018-04-23 | End: 2018-06-25

## 2018-04-23 NOTE — TELEPHONE ENCOUNTER
"Requested Prescriptions   Pending Prescriptions Disp Refills     simvastatin (ZOCOR) 20 MG tablet [Pharmacy Med Name: SIMVASTATIN 20MG TABS]  Medication may not be due for refill  Last Written Prescription Date:  2/16/218  Last Fill Quantity: 90 tablet,  # refills: 3   Last office visit: 2/26/2018 with prescribing provider:  Da      90 tablet 3     Sig: TAKE ONE TABLET BY MOUTH AT BEDTIME    Statins Protocol Passed    4/20/2018 11:31 PM       Passed - LDL on file in past 12 months    Recent Labs   Lab Test  02/26/18   1121   LDL  92            Passed - No abnormal creatine kinase in past 12 months    No lab results found.            Passed - Recent (12 mo) or future (30 days) visit within the authorizing provider's specialty    Patient had office visit in the last 12 months or has a visit in the next 30 days with authorizing provider or within the authorizing provider's specialty.  See \"Patient Info\" tab in inbasket, or \"Choose Columns\" in Meds & Orders section of the refill encounter.           Passed - Patient is age 18 or older        omeprazole (PRILOSEC) 20 MG CR capsule [Pharmacy Med Name: OMEPRAZOLE 20MG CPDR]  Medication may not be due for refill  Last Written Prescription Date:  2/26/218  Last Fill Quantity: 180 capsule,  # refills: 3   Last office visit: 2/26/2018 with prescribing provider:  Da    180 capsule 3     Sig: TAKE TWO CAPSULES BY MOUTH EVERY DAY    PPI Protocol Passed    4/20/2018 11:31 PM       Passed - Not on Clopidogrel (unless Pantoprazole ordered)       Passed - No diagnosis of osteoporosis on record       Passed - Recent (12 mo) or future (30 days) visit within the authorizing provider's specialty    Patient had office visit in the last 12 months or has a visit in the next 30 days with authorizing provider or within the authorizing provider's specialty.  See \"Patient Info\" tab in inbasket, or \"Choose Columns\" in Meds & Orders section of the refill encounter.           Passed - " Patient is age 18 or older

## 2018-06-14 NOTE — NURSING NOTE
"Chief Complaint   Patient presents with     Pharyngitis     x 3 weeks       Initial /74 (BP Location: Right arm, Patient Position: Chair, Cuff Size: Adult Large)  Pulse 78  Temp 98.3  F (36.8  C) (Oral)  Resp 16  Ht 5' 9\" (1.753 m)  Wt 187 lb (84.8 kg)  SpO2 97%  BMI 27.62 kg/m2 Estimated body mass index is 27.62 kg/(m^2) as calculated from the following:    Height as of this encounter: 5' 9\" (1.753 m).    Weight as of this encounter: 187 lb (84.8 kg).  Medication Reconciliation: complete Deepa Bragg MA  Health Maintenance has been reviewed.       " Date:  6/14/2018    Medication:  amphetamine-dextroamphetamine (ADDERALL) 30 MG tablet     amphetamine-dextroamphetamine (ADDERALL XR) 30 MG 24 hr capsule    [] Patient completely out of medication     Message to Prescriber: need script for two moths please.      If no future appointment scheduled, patient was contacted.   Outcome:   []  PSAR left voicemail for patient to contact the clinic to schedule a follow up   []  PSAR called patient and phone number was disconnected   []  PSAR called patient and voicemail box was full   []  PSAR spoke to patient and follow up was scheduled

## 2018-06-25 ENCOUNTER — OFFICE VISIT (OUTPATIENT)
Dept: FAMILY MEDICINE | Facility: CLINIC | Age: 43
End: 2018-06-25
Payer: COMMERCIAL

## 2018-06-25 VITALS
SYSTOLIC BLOOD PRESSURE: 116 MMHG | HEIGHT: 69 IN | TEMPERATURE: 98.4 F | DIASTOLIC BLOOD PRESSURE: 74 MMHG | BODY MASS INDEX: 28.16 KG/M2 | HEART RATE: 78 BPM | WEIGHT: 190.1 LBS | RESPIRATION RATE: 16 BRPM

## 2018-06-25 DIAGNOSIS — D23.9 DERMATOFIBROMA: ICD-10-CM

## 2018-06-25 DIAGNOSIS — L82.0 INFLAMED SEBORRHEIC KERATOSIS: Primary | ICD-10-CM

## 2018-06-25 PROCEDURE — 11400 EXC TR-EXT B9+MARG 0.5 CM<: CPT | Performed by: FAMILY MEDICINE

## 2018-06-25 PROCEDURE — 99213 OFFICE O/P EST LOW 20 MIN: CPT | Mod: 25 | Performed by: FAMILY MEDICINE

## 2018-06-25 PROCEDURE — 88305 TISSUE EXAM BY PATHOLOGIST: CPT | Performed by: FAMILY MEDICINE

## 2018-06-25 NOTE — MR AVS SNAPSHOT
"              After Visit Summary   6/25/2018    Ezekiel Lee    MRN: [id number]           Patient Information     Date Of Birth          1975        Visit Information        Provider Department      6/25/2018 6:15 PM Coty Roberson MD [facility]        Today's Diagnoses     Inflamed seborrheic keratosis    -  1    Dermatofibroma           Follow-ups after your visit        Follow-up notes from your care team     Return in about 1 week (around 7/2/2018) for mole removal.      Who to contact     If you have questions or need follow up information about today's clinic visit or your schedule please contact [facility] directly at [phone].  Normal or non-critical lab and imaging results will be communicated to you by MyChart, letter or phone within 4 business days after the clinic has received the results. If you do not hear from us within 7 days, please contact the clinic through [employer] or phone. If you have a critical or abnormal lab result, we will notify you by phone as soon as possible.  Submit refill requests through [facility] or call your pharmacy and they will forward the refill request to us. Please allow 3 business days for your refill to be completed.          Additional Information About Your Visit        MyChart Information     [facility] gives you secure access to your electronic health record. If you see a primary care provider, you can also send messages to your care team and make appointments. If you have questions, please call your primary care clinic.  If you do not have a primary care provider, please call [phone] and they will assist you.        Care EveryWhere ID     This is your Care EveryWhere ID. This could be used by other organizations to access your [place] medical records  [id number]        Your Vitals Were     Pulse Temperature Respirations Height BMI (Body Mass Index)       78 98.4  F (36.9  C) (Oral) 16 5' 9\" (1.753 m) 28.07 kg/m2        " Blood Pressure from Last 3 Encounters:   06/25/18 116/74   02/26/18 118/70   05/31/17 118/74    Weight from Last 3 Encounters:   06/25/18 190 lb 1.6 oz (86.2 kg)   02/26/18 181 lb (82.1 kg)   05/31/17 187 lb (84.8 kg)              Today, you had the following     No orders found for display       Primary Care Provider Office Phone # Fax #    Coty Roberson [phone] [address] [facility] [address]        Equal Access to Services     [facility]: Hadii pedro das hadasho Soomaali, waaxda luqadaha, qaybta kaalmada marlo, suzanne almieda . So [facility] [phone].    ATENCIÓN: Si habla español, tiene a felton disposición servicios gratuitos de asistencia lingüística. [facility] [phone].    We comply with applicable federal civil rights laws and [place] laws. We do not discriminate on the basis of race, color, national origin, age, disability, sex, sexual orientation, or gender identity.            Thank you!     Thank you for choosing [facility]  for your care. Our goal is always to provide you with excellent care. Hearing back from our patients is one way we can continue to improve our services. Please take a few minutes to complete the written survey that you may receive in the mail after your visit with us. Thank you!             Your Updated Medication List - Protect others around you: Learn how to safely use, store and throw away your medicines at www.disposemymeds.org.          This list is accurate as of 6/25/18  6:41 PM.  Always use your most recent med list.                   Brand Name Dispense Instructions for use Diagnosis    diphenoxylate-atropine 2.5-0.025 MG per tablet    LOMOTIL    30 tablet    TAKE 2 TABLETS BY MOUTH FOUR TIMES DAILY AS NEEDED FOR DIARRHEA    Diarrhea, unspecified type       emtricitabine-tenofovir 200-300 MG per tablet    TRUVADA    30 tablet    Take 1 tablet by mouth daily    High risk sexual behavior        MULTIVITAMIN & MINERAL PO           omeprazole 20 MG CR capsule    priLOSEC    90 capsule    TAKE TWO CAPSULES BY MOUTH EVERY DAY    Gastroesophageal reflux disease without esophagitis       rOPINIRole 0.5 MG tablet    REQUIP    180 tablet    Take 2 tablets (1 mg) by mouth nightly as needed    Restless legs syndrome (RLS)       simvastatin 20 MG tablet    ZOCOR    90 tablet    Take 1 tablet (20 mg) by mouth At Bedtime at bedtime.    Hyperlipidemia LDL goal <130       * zolpidem 12.5 MG CR tablet    AMBIEN CR    90 tablet    Take 1 tablet (12.5 mg) by mouth nightly as needed    Restless legs syndrome (RLS)       * zolpidem 10 MG tablet    AMBIEN    90 tablet    Take 1 tablet (10 mg) by mouth nightly as needed for sleep    Restless legs syndrome (RLS)       * Notice:  This list has 2 medication(s) that are the same as other medications prescribed for you. Read the directions carefully, and ask your doctor or other care provider to review them with you.

## 2018-06-25 NOTE — PROGRESS NOTES
"  SUBJECTIVE:   Ezekiel Lee is a 43 year old male who presents to clinic today for the following health issues:      Pt is here with concerns about 2 spots on skin , one on back of left shoulder, and inside of right calf.   On the right calf a new one has come up and it does not itch and he is not really concerned about it.   He also has another one and it is more concerning.   It is on his posterior left shoulder.   He had cupping done by his chiropracter and it bled.   He is concerned.       Objective:   Blood pressure 116/74, pulse 78, temperature 98.4  F (36.9  C), temperature source Oral, resp. rate 16, height 5' 9\" (1.753 m), weight 190 lb 1.6 oz (86.2 kg).   Neck:  There is no lymphadenopathy or thyroid tenderness or enlargement  Chest: Clear to auscultation bilaterally.  No wheezes, rales or retractions.  CV: Regular rate and rhythm without murmurs, rubs or gallops.    5mm by 8 mm oval lesion which is more pink on the top and light brown on the bottom      Right lower leg/calf : 4 mm by 4mm light pink papule which is firm    Assessment:  1. Suspect back lesion is inflammed seb derm but is changing and bleeding  2. Dermatofibroma on leg - not changing    Plan:  1. Will observe #2 for change or pain or coloration  2. #1 will remove    SUBJECTIVE:   Ezekiel Lee is a 43 year old male who presents for lesion removal. We have already discussed this procedure, including option of not performing surgery, technique of surgery and potential for scarring at a recent visit.    OBJECTIVE:   Patient appears well. Vitals are normal.  Skin: 5mm by 8 mm oval lesion which is more pink on the top and light brown on the bottom      ASSESSMENT:   Suspect inflamed seb derm    PLAN:   After informed consent was obtained, using Betadine for cleansing and 1% Lidocaine with epinephrine for anesthetic, with sterile technique, punch biopsy size 8 mm was performed.  3 4-0 ethilon sutures were used to approximate the wound edges.  " Antibiotic dressing is applied, and wound care instructions provided.  Be alert for any signs of cutaneous infection. The procedure was well tolerated without complications. Follow up: The specimen is labelled and sent to pathology for evaluation., Return for suture removal in 8 days..

## 2018-06-27 LAB — COPATH REPORT: NORMAL

## 2018-10-21 DIAGNOSIS — K21.9 GASTROESOPHAGEAL REFLUX DISEASE WITHOUT ESOPHAGITIS: ICD-10-CM

## 2018-10-21 DIAGNOSIS — G25.81 RESTLESS LEGS SYNDROME (RLS): ICD-10-CM

## 2018-10-22 NOTE — TELEPHONE ENCOUNTER
"Requested Prescriptions   Pending Prescriptions Disp Refills     rOPINIRole (REQUIP) 0.5 MG tablet [Pharmacy Med Name: ROPINIROLE HCL 0.5MG TABS]  Last Written Prescription Date:  11/13/2017  Last Fill Quantity: 180 tablet,  # refills: 1   Last office visit: 6/25/2018 with prescribing provider:  Da      180 tablet 1     Sig: TAKE 2 TABLETS BY MOUTH NIGHTLY AS NEEDED    Antiparkinson's Agents Protocol Failed    10/21/2018 12:29 AM       Failed - CBC on record in past 12 months    Recent Labs   Lab Test  12/14/15   0832   WBC  6.2   RBC  4.63   HGB  14.4   HCT  41.6   PLT  221                Passed - Blood pressure under 140/90 in past 12 months    BP Readings from Last 3 Encounters:   06/25/18 116/74   02/26/18 118/70   05/31/17 118/74                Passed - ALT on record in past 12 months        Recent Labs   Lab Test  02/26/18   1121   ALT  27            Passed - Serum Creatinine on file in past 12 months    Recent Labs   Lab Test  02/26/18   1121   CR  1.44*            Passed - Patient is age 18 or older       Passed - Recent (6 mo) or future (30 days) visit within the authorizing provider's specialty    Patient had office visit in the last 6 months or has a visit in the next 30 days with authorizing provider or within the authorizing provider's specialty.  See \"Patient Info\" tab in inbasket, or \"Choose Columns\" in Meds & Orders section of the refill encounter.            omeprazole (PRILOSEC) 20 MG CR capsule [Pharmacy Med Name: OMEPRAZOLE 20MG CPDR]  Last Written Prescription Date:  4/26/2018  Last Fill Quantity: 90 capsule,  # refills: 2   Last office visit: 6/25/2018 with prescribing provider:  Da      180 capsule 0     Sig: TAKE TWO CAPSULES BY MOUTH EVERY DAY    PPI Protocol Passed    10/21/2018 12:29 AM       Passed - Not on Clopidogrel (unless Pantoprazole ordered)       Passed - No diagnosis of osteoporosis on record       Passed - Recent (12 mo) or future (30 days) visit within the authorizing " "provider's specialty    Patient had office visit in the last 12 months or has a visit in the next 30 days with authorizing provider or within the authorizing provider's specialty.  See \"Patient Info\" tab in inbasket, or \"Choose Columns\" in Meds & Orders section of the refill encounter.             Passed - Patient is age 18 or older          "

## 2018-10-23 NOTE — TELEPHONE ENCOUNTER
Routing refill request to provider for review/approval because:  Labs not current  Vick Segovia RN

## 2018-10-24 RX ORDER — ROPINIROLE 0.5 MG/1
TABLET, FILM COATED ORAL
Qty: 180 TABLET | Refills: 1 | Status: SHIPPED | OUTPATIENT
Start: 2018-10-24 | End: 2019-09-21

## 2018-12-17 ENCOUNTER — MYC REFILL (OUTPATIENT)
Dept: FAMILY MEDICINE | Facility: CLINIC | Age: 43
End: 2018-12-17

## 2018-12-17 DIAGNOSIS — Z20.2 EXPOSURE TO STD: Primary | ICD-10-CM

## 2018-12-17 DIAGNOSIS — Z72.51 HIGH RISK SEXUAL BEHAVIOR: ICD-10-CM

## 2018-12-17 RX ORDER — EMTRICITABINE AND TENOFOVIR DISOPROXIL FUMARATE 200; 300 MG/1; MG/1
1 TABLET, FILM COATED ORAL DAILY
Qty: 30 TABLET | Refills: 3 | Status: CANCELLED | OUTPATIENT
Start: 2018-12-17

## 2018-12-18 NOTE — TELEPHONE ENCOUNTER
"Last Written Prescription Date:  12/18/17  Last Fill Quantity: 30 tablet,  # refills: 3   Last office visit: 6/25/2018 with prescribing provider:  TONI Roberson   Future Office Visit:      Requested Prescriptions   Pending Prescriptions Disp Refills     emtricitabine-tenofovir (TRUVADA) 200-300 MG per tablet 30 tablet 3     Sig: Take 1 tablet by mouth daily    Antiretroviral Agents Failed - 12/18/2018 10:43 AM       Failed - AST less than 55 on file in past 3 mos    Recent Labs   Lab Test 02/26/18  1121   AST 23            Failed - Recent (3 mo) or future (30 days) visit within the authorizing provider's specialty    Patient had office visit in the last 3 months or has a visit in the next 30 days with authorizing provider or within the authorizing provider's specialty.  See \"Patient Info\" tab in inbasket, or \"Choose Columns\" in Meds & Orders section of the refill encounter.               Failed - Refills for this medication group require provider approval       Failed - Serum Creatinine less than or equal to 1.4 on file in past 3 mos    Recent Labs   Lab Test 02/26/18  1121   CR 1.44*            Failed - Creatinine Clearance greater than 50 ml/min on file in past 3 mos.    No lab results found.         Failed - Serum HIV less than 200 on file in past 3 mos.    No lab results found.         Failed - CD4 count greater than 300 on file in past 3 mos.    No lab results found.         Failed - ALT less than 90 on file in past 3 mos.    Recent Labs   Lab Test 02/26/18  1121   ALT 27            Passed - Medication is active on med list       Passed - Patient is age 18 or older          "

## 2018-12-18 NOTE — TELEPHONE ENCOUNTER
"Requested Prescriptions   Pending Prescriptions Disp Refills     TRUVADA 200-300 MG per tablet [Pharmacy Med Name: TRUVADA  200MG-300MG TABLETS]  Last Written Prescription Date:  12/18/2017  Last Fill Quantity: 30 tablet,  # refills: 3   Last office visit: 6/25/2018 with prescribing provider:  Da   Future Office Visit:     30 tablet 0     Sig: TAKE 1 TABLET BY MOUTH EVERY DAY    Antiretroviral Agents Failed - 12/17/2018  2:34 PM       Failed - AST less than 55 on file in past 3 mos    Recent Labs   Lab Test 02/26/18  1121   AST 23            Failed - Recent (3 mo) or future (30 days) visit within the authorizing provider's specialty    Patient had office visit in the last 3 months or has a visit in the next 30 days with authorizing provider or within the authorizing provider's specialty.  See \"Patient Info\" tab in inbasket, or \"Choose Columns\" in Meds & Orders section of the refill encounter.               Failed - Refills for this medication group require provider approval       Failed - Serum Creatinine less than or equal to 1.4 on file in past 3 mos    Recent Labs   Lab Test 02/26/18  1121   CR 1.44*            Failed - Creatinine Clearance greater than 50 ml/min on file in past 3 mos.    No lab results found.         Failed - Serum HIV less than 200 on file in past 3 mos.    No lab results found.         Failed - CD4 count greater than 300 on file in past 3 mos.    No lab results found.         Failed - ALT less than 90 on file in past 3 mos.    Recent Labs   Lab Test 02/26/18  1121   ALT 27            Passed - Medication is active on med list       Passed - Patient is age 18 or older          "

## 2018-12-19 NOTE — TELEPHONE ENCOUNTER
Duplicate and Dr. Alegre did not approve refill. Will process after lab results are in.   Vick Segovia RN

## 2018-12-19 NOTE — TELEPHONE ENCOUNTER
Routing refill request to provider for review/approval because:  Drug not on the [facility] refill protocol   Labs not current:    A break in medication  Norma Beckford RN, BSN

## 2018-12-21 NOTE — TELEPHONE ENCOUNTER
Labs are all futured and looks like patient has not had these drawn yet and no future appt was made.    [facility] on home/cell number----inform patient he is needing to schedule a lab only appt per Dr. FLASH Christianson/[facility]

## 2018-12-27 RX ORDER — DEXAMETHASONE 0.75 MG/1
TABLET ORAL
Qty: 30 TABLET | Refills: 0 | OUTPATIENT
Start: 2018-12-27

## 2018-12-27 NOTE — TELEPHONE ENCOUNTER
Relayed message to schedule lab order. Patient states he will schedule it online at the start of the new year.    Marycruz Rosado  FWF - TC/FD  12/27/2018 1:59 PM

## 2019-01-05 DIAGNOSIS — E78.5 HYPERLIPIDEMIA LDL GOAL <130: ICD-10-CM

## 2019-01-05 DIAGNOSIS — Z11.3 SCREEN FOR STD (SEXUALLY TRANSMITTED DISEASE): ICD-10-CM

## 2019-01-05 LAB
ALBUMIN SERPL-MCNC: 4.2 G/DL (ref 3.4–5)
ALP SERPL-CCNC: 48 U/L (ref 40–150)
ALT SERPL W P-5'-P-CCNC: 30 U/L (ref 0–70)
ANION GAP SERPL CALCULATED.3IONS-SCNC: 7 MMOL/L (ref 3–14)
AST SERPL W P-5'-P-CCNC: 9 U/L (ref 0–45)
BILIRUB SERPL-MCNC: 0.6 MG/DL (ref 0.2–1.3)
BUN SERPL-MCNC: 15 MG/DL (ref 7–30)
CALCIUM SERPL-MCNC: 9.3 MG/DL (ref 8.5–10.1)
CHLORIDE SERPL-SCNC: 105 MMOL/L (ref 94–109)
CHOLEST SERPL-MCNC: 168 MG/DL
CO2 SERPL-SCNC: 27 MMOL/L (ref 20–32)
CREAT SERPL-MCNC: 1.32 MG/DL (ref 0.66–1.25)
GFR SERPL CREATININE-BSD FRML MDRD: 66 ML/MIN/{1.73_M2}
GLUCOSE SERPL-MCNC: 88 MG/DL (ref 70–99)
HDLC SERPL-MCNC: 59 MG/DL
LDLC SERPL CALC-MCNC: 91 MG/DL
NONHDLC SERPL-MCNC: 109 MG/DL
POTASSIUM SERPL-SCNC: 4.5 MMOL/L (ref 3.4–5.3)
PROT SERPL-MCNC: 7.5 G/DL (ref 6.8–8.8)
SODIUM SERPL-SCNC: 139 MMOL/L (ref 133–144)
TRIGL SERPL-MCNC: 89 MG/DL

## 2019-01-05 PROCEDURE — 80061 LIPID PANEL: CPT | Performed by: FAMILY MEDICINE

## 2019-01-05 PROCEDURE — 36415 COLL VENOUS BLD VENIPUNCTURE: CPT | Performed by: FAMILY MEDICINE

## 2019-01-05 PROCEDURE — 87340 HEPATITIS B SURFACE AG IA: CPT | Performed by: FAMILY MEDICINE

## 2019-01-05 PROCEDURE — 87491 CHLMYD TRACH DNA AMP PROBE: CPT | Performed by: FAMILY MEDICINE

## 2019-01-05 PROCEDURE — 86780 TREPONEMA PALLIDUM: CPT | Performed by: FAMILY MEDICINE

## 2019-01-05 PROCEDURE — 87591 N.GONORRHOEAE DNA AMP PROB: CPT | Performed by: FAMILY MEDICINE

## 2019-01-05 PROCEDURE — 80053 COMPREHEN METABOLIC PANEL: CPT | Performed by: FAMILY MEDICINE

## 2019-01-05 PROCEDURE — 87389 HIV-1 AG W/HIV-1&-2 AB AG IA: CPT | Performed by: FAMILY MEDICINE

## 2019-01-06 LAB
C TRACH DNA SPEC QL NAA+PROBE: NEGATIVE
N GONORRHOEA DNA SPEC QL NAA+PROBE: NEGATIVE
SPECIMEN SOURCE: NORMAL
SPECIMEN SOURCE: NORMAL
T PALLIDUM AB SER QL: NONREACTIVE

## 2019-01-07 LAB
HBV SURFACE AG SERPL QL IA: NONREACTIVE
HIV 1+2 AB+HIV1 P24 AG SERPL QL IA: NONREACTIVE

## 2019-01-07 RX ORDER — EMTRICITABINE AND TENOFOVIR DISOPROXIL FUMARATE 200; 300 MG/1; MG/1
1 TABLET, FILM COATED ORAL DAILY
Qty: 30 TABLET | Refills: 3 | Status: SHIPPED | OUTPATIENT
Start: 2019-01-07 | End: 2020-07-15

## 2019-01-24 ENCOUNTER — OFFICE VISIT (OUTPATIENT)
Dept: FAMILY MEDICINE | Facility: CLINIC | Age: 44
End: 2019-01-24
Payer: COMMERCIAL

## 2019-01-24 VITALS
WEIGHT: 188 LBS | TEMPERATURE: 98.4 F | RESPIRATION RATE: 20 BRPM | SYSTOLIC BLOOD PRESSURE: 131 MMHG | HEART RATE: 79 BPM | BODY MASS INDEX: 27.76 KG/M2 | DIASTOLIC BLOOD PRESSURE: 75 MMHG

## 2019-01-24 DIAGNOSIS — I80.9 PHLEBITIS AND THROMBOPHLEBITIS: Primary | ICD-10-CM

## 2019-01-24 PROCEDURE — 99213 OFFICE O/P EST LOW 20 MIN: CPT | Performed by: PHYSICIAN ASSISTANT

## 2019-01-24 NOTE — PROGRESS NOTES
SUBJECTIVE:   Ezekiel Lee is a 43 year old male who presents to clinic today for the following health issues:      Concern - lump  Onset: x 2 weeks    Description:    small lump on base of penis    Intensity: mild    Progression of Symptoms:  Intermittent. Improving in pain. No change in size.     Accompanying Signs & Symptoms:  mild discomfort, constant ache at first, worse in the am or with erection    Previous history of similar problem:    none  Patient did have heavy workout about 1-2 days before patient noticed sx possible hit groin area while setting down a bar with weights     Therapies Tried and outcome: none      Problem list and histories reviewed & adjusted, as indicated.  Additional history: as documented    Patient Active Problem List   Diagnosis     Spasm of muscle     Esophageal reflux     Family history of diabetes mellitus     Allergic rhinitis     Hyperlipidemia LDL goal <130     Restless legs syndrome (RLS)     Lipoma of skin and subcutaneous tissue     Past Surgical History:   Procedure Laterality Date     SURGICAL HISTORY OF -   1994    fractured mandible     SURGICAL HISTORY OF -   4/2013    left eye lazer surgery for detached retina       Social History     Tobacco Use     Smoking status: Never Smoker     Smokeless tobacco: Never Used   Substance Use Topics     Alcohol use: Yes     Comment: RARELY     Family History   Problem Relation Age of Onset     Lipids Mother      Neurologic Disorder Mother         multiple sclerosis     Lipids Father      Genitourinary Problems Father         kidney stone     Thyroid Disease Father         hyperthyroidism     Hypertension Maternal Grandmother      Gastrointestinal Disease Maternal Grandmother         diverticulitis     Hypertension Maternal Grandfather      Diabetes Maternal Grandfather      C.A.D. Paternal Grandfather         in his 60's     Diabetes Paternal Grandfather          Current Outpatient Medications   Medication Sig Dispense Refill      emtricitabine-tenofovir (TRUVADA) 200-300 MG per tablet Take 1 tablet by mouth daily 30 tablet 3     Multiple Vitamins-Minerals (MULTIVITAMIN & MINERAL PO)        omeprazole (PRILOSEC) 20 MG CR capsule TAKE TWO CAPSULES BY MOUTH EVERY  capsule 0     rOPINIRole (REQUIP) 0.5 MG tablet TAKE 2 TABLETS BY MOUTH NIGHTLY AS NEEDED 180 tablet 1     simvastatin (ZOCOR) 20 MG tablet Take 1 tablet (20 mg) by mouth At Bedtime at bedtime. 90 tablet 2     zolpidem (AMBIEN CR) 12.5 MG CR tablet Take 1 tablet (12.5 mg) by mouth nightly as needed (Patient not taking: Reported on 1/24/2019) 90 tablet 0     zolpidem (AMBIEN) 10 MG tablet Take 1 tablet (10 mg) by mouth nightly as needed for sleep (Patient not taking: Reported on 1/24/2019) 90 tablet 0     Allergies   Allergen Reactions     No Known Drug Allergies      BP Readings from Last 3 Encounters:   01/24/19 131/75   06/25/18 116/74   02/26/18 118/70    Wt Readings from Last 3 Encounters:   01/24/19 85.3 kg (188 lb)   06/25/18 86.2 kg (190 lb 1.6 oz)   02/26/18 82.1 kg (181 lb)                    Reviewed and updated as needed this visit by clinical staff  Tobacco  Allergies  Meds  Problems  Med Hx  Surg Hx  Fam Hx  Soc Hx        Reviewed and updated as needed this visit by Provider  Tobacco  Allergies  Meds  Problems  Med Hx  Surg Hx  Fam Hx         ROS:  Constitutional, HEENT, cardiovascular, pulmonary, gi and gu systems are negative, except as otherwise noted.    OBJECTIVE:     /75 (BP Location: Right arm, Patient Position: Chair, Cuff Size: Adult Large)   Pulse 79   Temp 98.4  F (36.9  C) (Oral)   Resp 20   Wt 85.3 kg (188 lb)   BMI 27.76 kg/m    Body mass index is 27.76 kg/m .  GENERAL: healthy, alert and no distress   (male): firm, moveable hard mass noted on dorsal base of shaft of penis. Mild tenderness to palpation. No erythema or warmth.   PSYCH: mentation appears normal, affect normal/bright    Diagnostic Test Results:  none      ASSESSMENT/PLAN:     (I80.9) Phlebitis and thrombophlebitis  (primary encounter diagnosis)  Comment: given history of trauma to area as well as exam findings, likely phlebitis. However, given 2 week course without improvement in size, recommending consult with urology. nsaids and heat to be used prn as well.   Plan: UROLOGY ADULT REFERRAL              Follow up: as above     Abhijit España PA-C  [facility]

## 2019-01-24 NOTE — PATIENT INSTRUCTIONS
Patient Education     Superficial Thrombophlebitis   The superficial veins are the veins near the surface of the skin. Superficial thrombophlebitis is a problem that occurs when one or more of these veins become red, irritated, and swollen. This is most often because of a blood clot.  Causes  The problem may occur after injury to a vein. It may also occur after having an intravenous (IV) line placed. Other factors that can make the problem more likely include:    Varicose veins    Venous insufficiency    Bleeding disorders    Prolonged periods of rest and not moving around    IV drug abuse    Pregnancy    Use of birth control pills or estrogen therapy  Symptoms  Symptoms may appear in the affected area. They can include:    Pain    Tenderness    Redness    Warmth    Swelling    Hardening of the vein  In most cases, superficial thrombophlebitis resolves on its own with no problems. Treatment is focused on relieving symptoms.  Sometimes, there is a risk that the deep veins in the body may also be involved. This can lead to more serious problems. In such cases, further testing and treatments may be needed. Your healthcare provider can tell you more about this.  Home care  To help relieve pain and swelling, you may be told to:    Apply heat or cold to the affected area. Do this for up to 10 minutes as often as directed.  ? Heat: Use a warm compress, such as a heating pad.  ? Cold: Use a cold compress, such as a cold pack or bag of ice wrapped in a thin towel.    Take nonsteroidal anti-inflammatory drugs (NSAIDS), such as ibuprofen. In some cases, other pain medicines may be prescribed.    Keep the affected limb (arm or leg) raised above heart level as directed.    Wear elastic compression stockings or bandages as directed.    Don't sit or stand for long periods. Get up and walk often.  To help treat a blood clot, a blood thinner (anticoagulant) may be prescribed. If this is needed, be sure to take the medicine exactly  as directed.  Follow-up care  Follow up with your healthcare provider as advised. If imaging tests are done, they will be reviewed by a doctor. You ll be told the results and any new findings that may affect your treatment.  When to seek medical advice  Call your healthcare provider right away if any of these occur:    Fever of 100.4 F (38 C) or higher, or as directed by your healthcare provider    Increasing pain, swelling, or tenderness in the affected area    Spreading warmth or redness in the affected area  Call 911  Call 911 if any of these occur:    Trouble breathing    Chest pain or discomfort that worsens with deep breathing or coughing    Coughing (may cough up blood)    Fast or irregular heartbeat    Sweating    Anxiety    Lightheadedness, dizziness, or fainting    Extreme confusion    Extreme drowsiness or trouble waking up    New pain in the chest, arm, shoulder, neck, or upper back   Date Last Reviewed: 4/1/2018 2000-2018 [employer]. [address] [place] [address]. All rights reserved. This information is not intended as a substitute for professional medical care. Always follow your healthcare professional's instructions.

## 2019-01-25 DIAGNOSIS — E78.5 HYPERLIPIDEMIA LDL GOAL <130: ICD-10-CM

## 2019-01-25 RX ORDER — SIMVASTATIN 20 MG
TABLET ORAL
Qty: 90 TABLET | Refills: 2 | Status: CANCELLED | OUTPATIENT
Start: 2019-01-25

## 2019-01-25 NOTE — TELEPHONE ENCOUNTER
"Requested Prescriptions   Pending Prescriptions Disp Refills     simvastatin (ZOCOR) 20 MG tablet [Pharmacy Med Name: SIMVASTATIN 20MG TABS]    Last Written Prescription Date: 4/26/18   Last Fill Quantity: 90 tablet,  # refills: 2   Last office visit: 1/24/2019 with prescribing provider:  Patria     Future Office Visit:   Next 5 appointments (look out 90 days)    Jan 28, 2019  6:15 PM CST  [facility] with Coty Alegre MD  [facility] ([facility] [address] [place] [address] [id number] [phone]          90 tablet 2     Sig: TAKE ONE TABLET BY MOUTH AT BEDTIME    Statins Protocol Passed - 1/25/2019 12:15 AM       Passed - LDL on file in past 12 months    Recent Labs   Lab Test 01/05/19  0854   LDL 91            Passed - No abnormal creatine kinase in past 12 months    No lab results found.            Passed - Recent (12 mo) or future (30 days) visit within the authorizing provider's specialty    Patient had office visit in the last 12 months or has a visit in the next 30 days with authorizing provider or within the authorizing provider's specialty.  See \"Patient Info\" tab in inbasket, or \"Choose Columns\" in Meds & Orders section of the refill encounter.             Passed - Medication is active on med list       Passed - Patient is age 18 or older          "

## 2019-01-28 ENCOUNTER — OFFICE VISIT (OUTPATIENT)
Dept: FAMILY MEDICINE | Facility: CLINIC | Age: 44
End: 2019-01-28
Payer: COMMERCIAL

## 2019-01-28 DIAGNOSIS — L30.4 CHAFING: ICD-10-CM

## 2019-01-28 DIAGNOSIS — E78.5 HYPERLIPIDEMIA LDL GOAL <130: ICD-10-CM

## 2019-01-28 DIAGNOSIS — L30.9 DERMATITIS: ICD-10-CM

## 2019-01-28 DIAGNOSIS — K64.4 EXTERNAL HEMORRHOIDS: ICD-10-CM

## 2019-01-28 DIAGNOSIS — G25.81 RESTLESS LEGS SYNDROME (RLS): ICD-10-CM

## 2019-01-28 DIAGNOSIS — N48.81 THROMBOSIS OF SUPERFICIAL VEIN OF PENIS: ICD-10-CM

## 2019-01-28 DIAGNOSIS — Z00.00 ROUTINE GENERAL MEDICAL EXAMINATION AT A HEALTH CARE FACILITY: Primary | ICD-10-CM

## 2019-01-28 PROCEDURE — 99396 PREV VISIT EST AGE 40-64: CPT | Performed by: FAMILY MEDICINE

## 2019-01-28 RX ORDER — HYDROCORTISONE ACETATE 25 MG/1
25 SUPPOSITORY RECTAL 2 TIMES DAILY
Qty: 20 SUPPOSITORY | Refills: 0 | Status: SHIPPED | OUTPATIENT
Start: 2019-01-28 | End: 2019-01-28

## 2019-01-28 RX ORDER — HYDROCORTISONE VALERATE CREAM 2 MG/G
CREAM TOPICAL 2 TIMES DAILY
Qty: 45 G | Refills: 1 | Status: SHIPPED | OUTPATIENT
Start: 2019-01-28 | End: 2020-07-15

## 2019-01-28 RX ORDER — SIMVASTATIN 20 MG
20 TABLET ORAL AT BEDTIME
Qty: 90 TABLET | Refills: 3 | Status: SHIPPED | OUTPATIENT
Start: 2019-01-28 | End: 2019-12-17

## 2019-01-28 RX ORDER — HYDROCORTISONE ACETATE 25 MG/1
25 SUPPOSITORY RECTAL 2 TIMES DAILY
Qty: 20 SUPPOSITORY | Refills: 0 | Status: SHIPPED | OUTPATIENT
Start: 2019-01-28 | End: 2021-07-21

## 2019-01-28 ASSESSMENT — ENCOUNTER SYMPTOMS
HEMATURIA: 0
ABDOMINAL PAIN: 0
NAUSEA: 0
HEADACHES: 0
SHORTNESS OF BREATH: 0
PARESTHESIAS: 0
FREQUENCY: 0
CONSTIPATION: 0
MYALGIAS: 0
WEAKNESS: 0
JOINT SWELLING: 0
HEMATOCHEZIA: 0
PALPITATIONS: 0
DYSURIA: 0
HEARTBURN: 0
SORE THROAT: 0
FEVER: 0
DIZZINESS: 0
CHILLS: 0
NERVOUS/ANXIOUS: 0
ARTHRALGIAS: 0
COUGH: 0
DIARRHEA: 0
EYE PAIN: 0

## 2019-01-29 NOTE — PROGRESS NOTES
SUBJECTIVE:   CC: Ezekiel Lee is an 43 year old male who presents for preventative health visit.     He is here for recheck.  He had labs done recently.    1- there is a rash which fluctuates in intensity off and on.  It will be hot and painful.  He used A and D ointment on it.  That helped.   It it around the butt cheeks.     2- ? Hemorrhoid.   It has been there a long time.   Maybe over 10 years.   It is slightly tender.   It is prone to bleeding.     3- last week he noted a lump on the top of his penis about the size of a tylenol pill.   It is hard.  It is getting better since last week.   It only bothers now with an erection.   He has been taking baby aspirin since last week.   4 - restless legs have been worse lately and taking more requip than usual.   Almost one pill every night.       Physical   Annual:     Getting at least 3 servings of Calcium per day:  Yes    Bi-annual eye exam:  Yes    Dental care twice a year:  NO    Sleep apnea or symptoms of sleep apnea:  None    Diet:  Regular (no restrictions)    Frequency of exercise:  4-5 days/week    Duration of exercise:  45-60 minutes    Taking medications regularly:  Yes    Medication side effects:  None    Additional concerns today:  Yes    PHQ-2 Total Score: 0        Today's PHQ-2 Score:   PHQ-2 ( 1999 Pfizer) 1/28/2019   Q1: Little interest or pleasure in doing things 0   Q2: Feeling down, depressed or hopeless 0   PHQ-2 Score 0   Q1: Little interest or pleasure in doing things Not at all   Q2: Feeling down, depressed or hopeless Not at all   PHQ-2 Score 0       Abuse: Current or Past(Physical, Sexual or Emotional)- No  Do you feel safe in your environment? Yes    Social History     Tobacco Use     Smoking status: Never Smoker     Smokeless tobacco: Never Used   Substance Use Topics     Alcohol use: Yes     Comment: RARELY     Alcohol Use 1/28/2019   If you drink alcohol do you typically have greater than 3 drinks per day OR greater than 7 drinks per  week? No   No flowsheet data found.    Last PSA: No results found for: PSA    Reviewed orders with patient. 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     Lipoma 02/26/2018    jelly bean sized left forearm     NONSPECIFIC MEDICAL HISTORY 1994    fractured mandible and had it wired shut     Pure hypercholesterolemia 2001       Past Surgical History:   Procedure Laterality Date     SURGICAL HISTORY OF -   1994    fractured mandible     SURGICAL HISTORY OF -   4/2013    left eye lazer surgery for detached retina       MEDICATIONS:  Current Outpatient Medications   Medication     hydrocortisone (ANUSOL-HC) 25 MG suppository     hydrocortisone (WESTCORT) 0.2 % external cream     simvastatin (ZOCOR) 20 MG tablet     emtricitabine-tenofovir (TRUVADA) 200-300 MG per tablet     Multiple Vitamins-Minerals (MULTIVITAMIN & MINERAL PO)     omeprazole (PRILOSEC) 20 MG CR capsule     rOPINIRole (REQUIP) 0.5 MG tablet     zolpidem (AMBIEN CR) 12.5 MG CR tablet     zolpidem (AMBIEN) 10 MG tablet     No current facility-administered medications for this visit.        SOCIAL HISTORY:  Social History     Tobacco Use     Smoking status: Never Smoker     Smokeless tobacco: Never Used   Substance Use Topics     Alcohol use: Yes     Comment: RARELY       Family History   Problem Relation Age of Onset     Lipids Mother      Neurologic Disorder Mother         multiple sclerosis     Lipids Father      Genitourinary Problems Father         kidney stone     Thyroid Disease Father         hyperthyroidism     Hypertension Maternal Grandmother      Gastrointestinal Disease Maternal Grandmother          diverticulitis     Hypertension Maternal Grandfather      Diabetes Maternal Grandfather      HUNG. Paternal Grandfather         in his 60's     Diabetes Paternal Grandfather        Review of Systems   Constitutional: Negative for chills and fever.   HENT: Negative for congestion, ear pain, hearing loss and sore throat.    Eyes: Negative for pain and visual disturbance.   Respiratory: Negative for cough and shortness of breath.    Cardiovascular: Negative for chest pain, palpitations and peripheral edema.   Gastrointestinal: Negative for abdominal pain, constipation, diarrhea, heartburn, hematochezia and nausea.   Genitourinary: Negative for discharge, dysuria, frequency, genital sores, hematuria, impotence and urgency.   Musculoskeletal: Negative for arthralgias, joint swelling and myalgias.   Skin: Negative for rash.   Neurological: Negative for dizziness, weakness, headaches and paresthesias.   Psychiatric/Behavioral: Negative for mood changes. The patient is not nervous/anxious.        OBJECTIVE:   There were no vitals taken for this visit.    Physical Exam  GENERAL: healthy, alert and no distress  EYES: Eyes grossly normal to inspection, PERRL and conjunctivae and sclerae normal  HENT: ear canals and TM's normal, nose and mouth without ulcers or lesions  NECK: no adenopathy, no asymmetry, masses, or scars and thyroid normal to palpation  RESP: lungs clear to auscultation - no rales, rhonchi or wheezes  CV: regular rate and rhythm, normal S1 S2, no S3 or S4, no murmur, click or rub, no peripheral edema and peripheral pulses strong  ABDOMEN: soft, nontender, no hepatosplenomegaly, no masses and bowel sounds normal   (male): testicles normal without atrophy or masses, no hernias and penis with proximal dorsal oval firmness size of lima bean - tender and without fluctuance - no redness and no warmth in the area  RECTAL: normal sphincter tone, no rectal masses and small pea sized red non bleeding 3 oclock  hemorrhoid and the skin around the anus and extending onto the inner gluteal cheeks is red but not super defined edges and medium red NOT cherry red - no rolled or leading edge  MS: no gross musculoskeletal defects noted, no edema  SKIN: no suspicious lesions or rashes  NEURO: Normal strength and tone, mentation intact and speech normal  PSYCH: mentation appears normal, affect normal/bright      Diagnostic Test Results:  Component      Latest Ref Rng & Units 1/5/2019   Sodium      133 - 144 mmol/L 139   Potassium      3.4 - 5.3 mmol/L 4.5   Chloride      94 - 109 mmol/L 105   Carbon Dioxide      20 - 32 mmol/L 27   Anion Gap      3 - 14 mmol/L 7   Glucose      70 - 99 mg/dL 88   Urea Nitrogen      7 - 30 mg/dL 15   Creatinine      0.66 - 1.25 mg/dL 1.32 (H)   GFR Estimate      >60 mL/min/1.73:m2 66   GFR Estimate If Black      >60 mL/min/1.73:m2 76   Calcium      8.5 - 10.1 mg/dL 9.3   Bilirubin Total      0.2 - 1.3 mg/dL 0.6   Albumin      3.4 - 5.0 g/dL 4.2   Protein Total      6.8 - 8.8 g/dL 7.5   Alkaline Phosphatase      40 - 150 U/L 48   ALT      0 - 70 U/L 30   AST      0 - 45 U/L 9   Cholesterol      <200 mg/dL 168   Triglycerides      <150 mg/dL 89   HDL Cholesterol      >39 mg/dL 59   LDL Cholesterol Calculated      <100 mg/dL 91   Non HDL Cholesterol      <130 mg/dL 109   Specimen Description       Urine   Chlamydia Trachomatis PCR      NEG:Negative Negative   Specimen Descrip       Urine   N Gonorrhea PCR      NEG:Negative Negative   Hep B Surface Agn      NR:Nonreactive Nonreactive   Treponema Antibodies      NR:Nonreactive Nonreactive   HIV Antigen Antibody Combo      NR:Nonreactive     Nonreactive       ASSESSMENT/PLAN:   1. Hyperlipidemia LDL goal <130  Stable and under control  - simvastatin (ZOCOR) 20 MG tablet; Take 1 tablet (20 mg) by mouth At Bedtime at bedtime.  Dispense: 90 tablet; Refill: 3    2. Routine general medical examination at a health care facility  stable    3. External  "hemorrhoids  The potential side effects of the prescription medication were discussed with the patient.   - hydrocortisone (ANUSOL-HC) 25 MG suppository; Place 1 suppository (25 mg) rectally 2 times daily  Dispense: 20 suppository; Refill: 0    4. Dermatitis  Also use powder such as goldbond  - hydrocortisone (WESTCORT) 0.2 % external cream; Apply topically 2 times daily  Dispense: 45 g; Refill: 1    5. Chafing  See above    6. Thrombosis of superficial vein of penis  May apply moist heat bid and continue asa daily  Urology referral     7. Restless legs syndrome (RLS)  Stable  Does not need refills right now    RECHECK IN 3 MONTHS      COUNSELING:   Reviewed preventive health counseling, as reflected in patient instructions  Special attention given to:        Regular exercise       Healthy diet/nutrition       Safe sex practices/STD prevention    BP Readings from Last 1 Encounters:   01/24/19 131/75     Estimated body mass index is 27.76 kg/m  as calculated from the following:    Height as of 6/25/18: 1.753 m (5' 9\").    Weight as of 1/24/19: 85.3 kg (188 lb).           reports that  has never smoked. he has never used smokeless tobacco.      Counseling Resources:  ATP IV Guidelines  Pooled Cohorts Equation Calculator  FRAX Risk Assessment  ICSI Preventive Guidelines  Dietary Guidelines for Americans, 2010  USDA's MyPlate  ASA Prophylaxis  Lung CA Screening    Coty Alegre MD  [facility]  "

## 2019-01-29 NOTE — PATIENT INSTRUCTIONS
Preventive Health Recommendations  Male Ages 40 to 49    Yearly exam:             See your health care provider every year in order to  o   Review health changes.   o   Discuss preventive care.    o   Review your medicines if your doctor has prescribed any.    You should be tested each year for STDs (sexually transmitted diseases) if you re at risk.     Have a cholesterol test every 5 years.     Have a colonoscopy (test for colon cancer) if someone in your family has had colon cancer or polyps before age 50.     After age 45, have a diabetes test (fasting glucose). If you are at risk for diabetes, you should have this test every 3 years.      Talk with your health care provider about whether or not a prostate cancer screening test (PSA) is right for you.    Shots: Get a flu shot each year. Get a tetanus shot every 10 years.     Nutrition:    Eat at least 5 servings of fruits and vegetables daily.     Eat whole-grain bread, whole-wheat pasta and brown rice instead of white grains and rice.     Get adequate Calcium and Vitamin D.     Lifestyle    Exercise for at least 150 minutes a week (30 minutes a day, 5 days a week). This will help you control your weight and prevent disease.     Limit alcohol to one drink per day.     No smoking.     Wear sunscreen to prevent skin cancer.     See your dentist every six months for an exam and cleaning.       Your provider has referred you to: [facility]: [facility] [phone]   [email or web address]

## 2019-01-29 NOTE — PROGRESS NOTES
"  SUBJECTIVE:   Ezekiel Lee is a 43 year old male who presents to clinic today for the following health issues:      Hemorrhoids       Duration: years, sx getting worse    Description:   Pain: no   Itching: no     Accompanying signs and symptoms: has rash on butt  Blood in stool: no   Changes in stool pattern: no     History (similar episodes/previous evaluation): don't recall being evaluated in the past    Precipitating or alleviating factors: None    Therapies tried and outcome: preparation H-not effective       Problem list and histories reviewed & adjusted, as indicated.  Additional history: {NONE - AS DOCUMENTED:616504::\"as documented\"}    {HIST REVIEW/ LINKS 2:627797}    Reviewed and updated as needed this visit by clinical staff       Reviewed and updated as needed this visit by Provider         {PROVIDER CHARTING PREFERENCE:872920}    "

## 2019-02-08 ENCOUNTER — PRE VISIT (OUTPATIENT)
Dept: UROLOGY | Facility: CLINIC | Age: 44
End: 2019-02-08

## 2019-02-08 NOTE — TELEPHONE ENCOUNTER
MEDICAL RECORDS REQUEST   [facility]  Urology Clinic  [address] [place] [address]  PHONE: [phone]  Fax: [phone]        FUTURE VISIT INFORMATION                                                   Ezekiel Lee : 1975 scheduled for future visit at [facility]    APPOINTMENT INFORMATION:    Date: 2019    Provider:  SAKSHI BARTLETT    Reason for Visit/Diagnosis: TESTICLE AND PENILE PAIN    REFERRAL INFORMATION:    Referring provider:  SELF    Specialty: SELF    Referring providers clinic:  SELF    Clinic contact number:  SELF    RECORDS REQUESTED FOR VISIT                                                     NOTES  STATUS/DETAILS   OFFICE NOTE from referring provider  YES   OFFICE NOTE from other specialist  yes   DISCHARGE SUMMARY from hospital  no   DISCHARGE REPORT from the ER  no   OPERATIVE REPORT  no   MEDICATION LIST  yes       PRE-VISIT CHECKLIST      Record collection complete Yes   Appointment appropriately scheduled           (right time/right provider) Yes   MyChart activation Yes   Questionnaire complete If no, please explain IN PROCESS     Completed by: Vicky Birmingham

## 2019-03-11 DIAGNOSIS — K21.9 GASTROESOPHAGEAL REFLUX DISEASE WITHOUT ESOPHAGITIS: ICD-10-CM

## 2019-03-12 ENCOUNTER — PRE VISIT (OUTPATIENT)
Dept: UROLOGY | Facility: CLINIC | Age: 44
End: 2019-03-12

## 2019-03-12 NOTE — TELEPHONE ENCOUNTER
"Requested Prescriptions   Pending Prescriptions Disp Refills     omeprazole (PRILOSEC) 20 MG DR capsule [Pharmacy Med Name: OMEPRAZOLE 20MG CPDR]  Last Written Prescription Date:  10/24/2018  Last Fill Quantity: 180 capsule,  # refills: 0   Last office visit: 1/28/2019 with prescribing provider:  Sis   Future Office Visit:     180 capsule 0     Sig: TAKE TWO CAPSULES BY MOUTH EVERY DAY    PPI Protocol Passed - 3/11/2019 10:16 AM       Passed - Not on Clopidogrel (unless Pantoprazole ordered)       Passed - No diagnosis of osteoporosis on record       Passed - Recent (12 mo) or future (30 days) visit within the authorizing provider's specialty    Patient had office visit in the last 12 months or has a visit in the next 30 days with authorizing provider or within the authorizing provider's specialty.  See \"Patient Info\" tab in inbasket, or \"Choose Columns\" in Meds & Orders section of the refill encounter.             Passed - Medication is active on med list       Passed - Patient is age 18 or older          "

## 2019-03-12 NOTE — TELEPHONE ENCOUNTER
Reason for Visit: Consult    Diagnosis: painful penile bump- likely phlebitis    Contact Patient: n/a    Rooming Requirements: normal      Mila Hickey LPN  03/12/19  2:31 PM

## 2019-03-13 NOTE — TELEPHONE ENCOUNTER
Routing refill request to provider to review approval because:  Not reviewed at January visit  Daniela Ordoñez RN, BSN  Message handled by Nurse Triage.

## 2019-03-22 ENCOUNTER — OFFICE VISIT (OUTPATIENT)
Dept: UROLOGY | Facility: CLINIC | Age: 44
End: 2019-03-22
Payer: COMMERCIAL

## 2019-03-22 VITALS
DIASTOLIC BLOOD PRESSURE: 65 MMHG | SYSTOLIC BLOOD PRESSURE: 119 MMHG | WEIGHT: 180 LBS | HEIGHT: 69 IN | BODY MASS INDEX: 26.66 KG/M2 | HEART RATE: 69 BPM

## 2019-03-22 DIAGNOSIS — N48.6 PEYRONIE'S DISEASE: Primary | ICD-10-CM

## 2019-03-22 ASSESSMENT — PAIN SCALES - GENERAL: PAINLEVEL: NO PAIN (0)

## 2019-03-22 ASSESSMENT — MIFFLIN-ST. JEOR: SCORE: 1701.85

## 2019-03-22 ASSESSMENT — ENCOUNTER SYMPTOMS
NAIL CHANGES: 0
SKIN CHANGES: 0
POOR WOUND HEALING: 0

## 2019-03-22 NOTE — LETTER
RE: Ezekiel eLe  [address]  [place] [address]     Dear Colleague,    Thank you for referring your patient, Ezekiel Lee, to the [facility] UROLOG[facility] AND [facility]ST FOR PROSTATE AND UROLOGIC CANCERS at [facility]. Please see a copy of my visit note below.    I am seeing Ezekiel Lee in consultation for evaluation of penile lump.    HPI:  Ezekiel Lee is a 44 year old male woke with a discomfort in the penis and a palpable lump.  Felt sore all the time, worse with erections.  Pain has since subsided.  Just notices a firm lump now.  NOT noticing penile curvature.    No Dupuytren's contractures     He does do strength training- perhaps hit the area with the bar?    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     Lipoma 02/26/2018    jelly bean sized left forearm     NONSPECIFIC MEDICAL HISTORY 1994    fractured mandible and had it wired shut     Pure hypercholesterolemia 2001       PAST SURG HX:  Past Surgical History:   Procedure Laterality Date     SURGICAL HISTORY OF -   1994    fractured mandible     SURGICAL HISTORY OF -   4/2013    left eye lazer surgery for detached retina        FAMILY HX:  Family History   Problem Relation Age of Onset     Lipids Mother      Neurologic Disorder Mother         multiple sclerosis     Lipids Father      Genitourinary Problems Father         kidney stone     Thyroid Disease Father         hyperthyroidism     Nephrolithiasis Father      Hypertension Maternal Grandmother      Gastrointestinal Disease Maternal Grandmother         diverticulitis     Hypertension Maternal Grandfather      Diabetes Maternal Grandfather      C.A.D. Paternal Grandfather         in his 60's     Diabetes Paternal Grandfather      SOCIAL HX:  Social History     Tobacco Use     Smoking status: Never Smoker     Smokeless  "tobacco: Never Used   Substance Use Topics     Alcohol use: Yes     Comment: RARELY     Drug use: No       MEDICATIONS:  Current Outpatient Medications   Medication Sig     emtricitabine-tenofovir (TRUVADA) 200-300 MG per tablet Take 1 tablet by mouth daily     hydrocortisone (ANUSOL-HC) 25 MG suppository Place 1 suppository (25 mg) rectally 2 times daily     hydrocortisone (WESTCORT) 0.2 % external cream Apply topically 2 times daily     Multiple Vitamins-Minerals (MULTIVITAMIN & MINERAL PO)      omeprazole (PRILOSEC) 20 MG DR capsule TAKE TWO CAPSULES BY MOUTH EVERY DAY     rOPINIRole (REQUIP) 0.5 MG tablet TAKE 2 TABLETS BY MOUTH NIGHTLY AS NEEDED     simvastatin (ZOCOR) 20 MG tablet Take 1 tablet (20 mg) by mouth At Bedtime at bedtime.     zolpidem (AMBIEN CR) 12.5 MG CR tablet Take 1 tablet (12.5 mg) by mouth nightly as needed (Patient not taking: Reported on 1/24/2019)     zolpidem (AMBIEN) 10 MG tablet Take 1 tablet (10 mg) by mouth nightly as needed for sleep (Patient not taking: Reported on 1/24/2019)     No current facility-administered medications for this visit.        ALLERGIES:  No known drug allergies    GENERAL PHYSICAL EXAM:     /65   Pulse 69   Ht 1.753 m (5' 9\")   Wt 81.6 kg (180 lb)   BMI 26.58 kg/m      Constitutional: No acute distress. Well nourished.   PSYCH: normal mood and affect.  NEURO: normal gait, no focal deficits.   EYES: anicteric, EOMI, PERR.  CARDIOPULMONARY: breathing non-labored, pulse regular rate/rhythm, no peripheral edema.  GI: Abdomen soft, non-tender, no surgical scars, no organomegaly.  MUSCULOSKELETAL: normal limb proportions, no muscle wasting, no contractures.  SKIN: Normal virilized hair distribution, no lesions, warts or rashes over genitalia, abdomen extremities or face.  HEME/LYMPH: no ecchymosis, no lymphadenopathy in groin, no lymphedema.     EXAM:  Phallus circumcised, meatus adequate, dense plaques palpated- about 1x1.5cm at the suspensory ligament, " there is also a ventral septal plaque noted at 6 o'clock position.  Plaques are firm but nontender. No distal plaques   Left testis descended , size is normal  , consistency is normal . No intra-testicular masses.   Right testis descended , size is normal  , consistency is normal . No intra-testicular masses.   Epididymes present, non-tender, not-enlarged.   Left cord: Vas present. no varicocele noted.  Right cord: Vas present. no varicocele noted.     Prostate exam: deferred     Imaging/labs:  Lab Results   Component Value Date    CR 1.32 01/05/2019    CR 1.44 02/26/2018    CR 1.32 02/09/2017       ASSESSMENT:     Peyronie's disease involving proximal penis and suspensory ligament area.    PLAN:    Discussed natural history of Peyronie's disease.  Discussed this can cause curvature to erections, pain, or ED.  He has none of these at present. He had a very typical case of Peyronie's disease with initial pain followed by resolution of pain and residual scar tissue.    Advised be careful not to stress or injure the area in the future    PRN follow-up.      Bartolo Ackerman MD     Urological Surgeon

## 2019-03-22 NOTE — NURSING NOTE
"Chief Complaint   Patient presents with     Consult     \"mass on penis\", was painful but now is just \"annoying\"       Blood pressure 119/65, pulse 69, height 1.753 m (5' 9\"), weight 81.6 kg (180 lb). Body mass index is 26.58 kg/m .    Patient Active Problem List   Diagnosis     Spasm of muscle     Esophageal reflux     Family history of diabetes mellitus     Allergic rhinitis     Hyperlipidemia LDL goal <130     Restless legs syndrome (RLS)     Lipoma of skin and subcutaneous tissue     Dermatitis     Chafing     External hemorrhoids     Thrombosis of superficial vein of penis       Allergies   Allergen Reactions     No Known Drug Allergies        Current Outpatient Medications   Medication Sig Dispense Refill     emtricitabine-tenofovir (TRUVADA) 200-300 MG per tablet Take 1 tablet by mouth daily 30 tablet 3     hydrocortisone (ANUSOL-HC) 25 MG suppository Place 1 suppository (25 mg) rectally 2 times daily 20 suppository 0     hydrocortisone (WESTCORT) 0.2 % external cream Apply topically 2 times daily 45 g 1     Multiple Vitamins-Minerals (MULTIVITAMIN & MINERAL PO)        omeprazole (PRILOSEC) 20 MG DR capsule TAKE TWO CAPSULES BY MOUTH EVERY  capsule 2     rOPINIRole (REQUIP) 0.5 MG tablet TAKE 2 TABLETS BY MOUTH NIGHTLY AS NEEDED 180 tablet 1     simvastatin (ZOCOR) 20 MG tablet Take 1 tablet (20 mg) by mouth At Bedtime at bedtime. 90 tablet 3     zolpidem (AMBIEN CR) 12.5 MG CR tablet Take 1 tablet (12.5 mg) by mouth nightly as needed (Patient not taking: Reported on 1/24/2019) 90 tablet 0     zolpidem (AMBIEN) 10 MG tablet Take 1 tablet (10 mg) by mouth nightly as needed for sleep (Patient not taking: Reported on 1/24/2019) 90 tablet 0       Social History     Tobacco Use     Smoking status: Never Smoker     Smokeless tobacco: Never Used   Substance Use Topics     Alcohol use: Yes     Comment: RARELY     Drug use: No       Mila Hickey LPN  3/22/2019  8:47 AM  "

## 2019-03-22 NOTE — PROGRESS NOTES
I am seeing Ezekiel Lee in consultation for evaluation of penile lump.    HPI:  Ezekiel Lee is a 44 year old male woke with a discomfort in the penis and a palpable lump.  Felt sore all the time, worse with erections.  Pain has since subsided.  Just notices a firm lump now.  NOT noticing penile curvature.    No Dupuytren's contractures     He does do strength training- perhaps hit the area with the bar?    REVIEW OF SYSTEMS:  Answers for HPI/ROS submitted by the patient on 3/22/2019   General Symptoms: No  Skin Symptoms: Yes  HENT Symptoms: No  EYE SYMPTOMS: No  HEART SYMPTOMS: No  LUNG SYMPTOMS: No  INTESTINAL SYMPTOMS: No  URINARY SYMPTOMS: No  REPRODUCTIVE SYMPTOMS: No  SKELETAL SYMPTOMS: No  BLOOD SYMPTOMS: No  NERVOUS SYSTEM SYMPTOMS: No  MENTAL HEALTH SYMPTOMS: No  Changes in hair: No  Changes in moles/birth marks: No  Itching: Yes  Rashes: Yes  Changes in nails: No  Acne: No  Change in facial hair: No  Warts: No  Non-healing sores: No  Scarring: No  Flaking of skin: No  Color changes of hands/feet in cold : No  Sun sensitivity: No  Skin thickening: No    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     Lipoma 02/26/2018    jelly bean sized left forearm     NONSPECIFIC MEDICAL HISTORY 1994    fractured mandible and had it wired shut     Pure hypercholesterolemia 2001       PAST SURG HX:  Past Surgical History:   Procedure Laterality Date     SURGICAL HISTORY OF -   1994    fractured mandible     SURGICAL HISTORY OF -   4/2013    left eye lazer surgery for detached retina        FAMILY HX:  Family History   Problem Relation Age of Onset     Lipids Mother      Neurologic Disorder Mother         multiple sclerosis     Lipids Father      Genitourinary Problems Father         kidney stone     Thyroid Disease Father         hyperthyroidism     Nephrolithiasis  "Father      Hypertension Maternal Grandmother      Gastrointestinal Disease Maternal Grandmother         diverticulitis     Hypertension Maternal Grandfather      Diabetes Maternal Grandfather      TINY.A.СВЕТЛАНА. Paternal Grandfather         in his 60's     Diabetes Paternal Grandfather        SOCIAL HX:  Social History     Tobacco Use     Smoking status: Never Smoker     Smokeless tobacco: Never Used   Substance Use Topics     Alcohol use: Yes     Comment: RARELY     Drug use: No       MEDICATIONS:  Current Outpatient Medications   Medication Sig     emtricitabine-tenofovir (TRUVADA) 200-300 MG per tablet Take 1 tablet by mouth daily     hydrocortisone (ANUSOL-HC) 25 MG suppository Place 1 suppository (25 mg) rectally 2 times daily     hydrocortisone (WESTCORT) 0.2 % external cream Apply topically 2 times daily     Multiple Vitamins-Minerals (MULTIVITAMIN & MINERAL PO)      omeprazole (PRILOSEC) 20 MG DR capsule TAKE TWO CAPSULES BY MOUTH EVERY DAY     rOPINIRole (REQUIP) 0.5 MG tablet TAKE 2 TABLETS BY MOUTH NIGHTLY AS NEEDED     simvastatin (ZOCOR) 20 MG tablet Take 1 tablet (20 mg) by mouth At Bedtime at bedtime.     zolpidem (AMBIEN CR) 12.5 MG CR tablet Take 1 tablet (12.5 mg) by mouth nightly as needed (Patient not taking: Reported on 1/24/2019)     zolpidem (AMBIEN) 10 MG tablet Take 1 tablet (10 mg) by mouth nightly as needed for sleep (Patient not taking: Reported on 1/24/2019)     No current facility-administered medications for this visit.        ALLERGIES:  No known drug allergies      GENERAL PHYSICAL EXAM:     /65   Pulse 69   Ht 1.753 m (5' 9\")   Wt 81.6 kg (180 lb)   BMI 26.58 kg/m     Constitutional: No acute distress. Well nourished.   PSYCH: normal mood and affect.  NEURO: normal gait, no focal deficits.   EYES: anicteric, EOMI, PERR.  CARDIOPULMONARY: breathing non-labored, pulse regular rate/rhythm, no peripheral edema.  GI: Abdomen soft, non-tender, no surgical scars, no " organomegaly.  MUSCULOSKELETAL: normal limb proportions, no muscle wasting, no contractures.  SKIN: Normal virilized hair distribution, no lesions, warts or rashes over genitalia, abdomen extremities or face.  HEME/LYMPH: no ecchymosis, no lymphadenopathy in groin, no lymphedema.     EXAM:  Phallus circumcised, meatus adequate, dense plaques palpated- about 1x1.5cm at the suspensory ligament, there is also a ventral septal plaque noted at 6 o'clock position.  Plaques are firm but nontender. No distal plaques   Left testis descended , size is normal  , consistency is normal . No intra-testicular masses.   Right testis descended , size is normal  , consistency is normal . No intra-testicular masses.   Epididymes present, non-tender, not-enlarged.   Left cord: Vas present. no varicocele noted.  Right cord: Vas present. no varicocele noted.     Prostate exam: deferred     Imaging/labs:  Lab Results   Component Value Date    CR 1.32 01/05/2019    CR 1.44 02/26/2018    CR 1.32 02/09/2017       ASSESSMENT:     Peyronie's disease involving proximal penis and suspensory ligament area.    PLAN:    Discussed natural history of Peyronie's disease.  Discussed this can cause curvature to erections, pain, or ED.  He has none of these at present. He had a very typical case of Peyronie's disease with initial pain followed by resolution of pain and residual scar tissue.    Advised be careful not to stress or injure the area in the future    PRN follow-up.      Bartolo Ackerman MD     Urological Surgeon

## 2019-03-25 PROBLEM — N48.6 PEYRONIE DISEASE: Status: ACTIVE | Noted: 2019-03-01

## 2019-09-21 DIAGNOSIS — G25.81 RESTLESS LEGS SYNDROME (RLS): ICD-10-CM

## 2019-09-22 NOTE — TELEPHONE ENCOUNTER
"Requested Prescriptions   Pending Prescriptions Disp Refills     rOPINIRole (REQUIP) 0.5 MG tablet [Pharmacy Med Name: ROPINIROLE HCL 0.5MG TABS] 180 tablet 1     Sig: TAKE TWO TABLETS BY MOUTH NIGHTLY AS NEEDED       Antiparkinson's Agents Protocol Failed - 9/21/2019 11:40 AM        Failed - CBC on record in past 12 months     Recent Labs   Lab Test 12/14/15  0832   WBC 6.2   RBC 4.63   HGB 14.4   HCT 41.6                    Failed - Recent (6 mo) or future (30 days) visit within the authorizing provider's specialty     Patient had office visit in the last 6 months or has a visit in the next 30 days with authorizing provider or within the authorizing provider's specialty.  See \"Patient Info\" tab in inbasket, or \"Choose Columns\" in Meds & Orders section of the refill encounter.            Passed - Blood pressure under 140/90 in past 12 months     BP Readings from Last 3 Encounters:   03/22/19 119/65   01/24/19 131/75   06/25/18 116/74                 Passed - ALT on record in past 12 months         Recent Labs   Lab Test 01/05/19  0854   ALT 30             Passed - Serum Creatinine on file in past 12 months     Recent Labs   Lab Test 01/05/19  0854   CR 1.32*             Passed - Medication is active on med list        Passed - Patient is age 18 or older          Last Written Prescription Date:  10/24/2018  Last Fill Quantity: 180,  # refills: 1   Last office visit: 1/28/2019 with prescribing provider:  Dr kierra Roberson   Future Office Visit:      "

## 2019-09-23 RX ORDER — ROPINIROLE 0.5 MG/1
TABLET, FILM COATED ORAL
Qty: 180 TABLET | Refills: 1 | Status: SHIPPED | OUTPATIENT
Start: 2019-09-23 | End: 2021-07-21

## 2019-09-23 NOTE — TELEPHONE ENCOUNTER
Pending Prescriptions:                       Disp   Refills    rOPINIRole (REQUIP) 0.5 MG tablet [Pharmac*180 ta*1        Sig: TAKE TWO TABLETS BY MOUTH NIGHTLY AS NEEDED    Routing refill request to provider for review/approval because:  Labs see below.    Franklyn Khan, RN, BSN      (Covering RN, please route response(s) to your care team for any follow up that is needed. Thank you!)

## 2019-11-05 ENCOUNTER — HEALTH MAINTENANCE LETTER (OUTPATIENT)
Age: 44
End: 2019-11-05

## 2019-12-17 DIAGNOSIS — K21.9 GASTROESOPHAGEAL REFLUX DISEASE WITHOUT ESOPHAGITIS: ICD-10-CM

## 2019-12-17 DIAGNOSIS — E78.5 HYPERLIPIDEMIA LDL GOAL <130: ICD-10-CM

## 2019-12-17 RX ORDER — SIMVASTATIN 20 MG
TABLET ORAL
Qty: 90 TABLET | Refills: 3 | Status: SHIPPED | OUTPATIENT
Start: 2019-12-17 | End: 2020-03-19

## 2019-12-17 NOTE — TELEPHONE ENCOUNTER
"Requested Prescriptions   Pending Prescriptions Disp Refills     simvastatin (ZOCOR) 20 MG tablet [Pharmacy Med Name: SIMVASTATIN 20MG TABS] 90 tablet 3     Sig: TAKE ONE TABLET BY MOUTH AT BEDTIME   Last Written Prescription Date:  1/28/19  Last Fill Quantity: 90,  # refills: 3   Last office visit: 1/28/2019 with prescribing provider   Future Office Visit:        Statins Protocol Passed - 12/17/2019 12:28 AM        Passed - LDL on file in past 12 months     Recent Labs   Lab Test 01/05/19  0854   LDL 91             Passed - No abnormal creatine kinase in past 12 months     No lab results found.             Passed - Recent (12 mo) or future (30 days) visit within the authorizing provider's specialty     Patient has had an office visit with the authorizing provider or a provider within the authorizing providers department within the previous 12 mos or has a future within next 30 days. See \"Patient Info\" tab in inbasket, or \"Choose Columns\" in Meds & Orders section of the refill encounter.              Passed - Medication is active on med list        Passed - Patient is age 18 or older        omeprazole (PRILOSEC) 20 MG DR capsule [Pharmacy Med Name: OMEPRAZOLE 20MG CPDR] 180 capsule 2     Sig: TAKE TWO CAPSULES BY MOUTH EVERY DAY   Last Written Prescription Date:  3/13/19  Last Fill Quantity: 180,  # refills: 2   Last office visit: 1/28/2019 with prescribing provider   Future Office Visit:        PPI Protocol Passed - 12/17/2019 12:28 AM        Passed - Not on Clopidogrel (unless Pantoprazole ordered)        Passed - No diagnosis of osteoporosis on record        Passed - Recent (12 mo) or future (30 days) visit within the authorizing provider's specialty     Patient has had an office visit with the authorizing provider or a provider within the authorizing providers department within the previous 12 mos or has a future within next 30 days. See \"Patient Info\" tab in inbasket, or \"Choose Columns\" in Meds & Orders " section of the refill encounter.              Passed - Medication is active on med list        Passed - Patient is age 18 or older        Prescription approved per [facility] Refill Protocol.  Cornelia Champagne RN on 12/17/2019 at 11:25 AM

## 2020-03-19 ENCOUNTER — MYC REFILL (OUTPATIENT)
Dept: FAMILY MEDICINE | Facility: CLINIC | Age: 45
End: 2020-03-19

## 2020-03-19 DIAGNOSIS — E78.5 HYPERLIPIDEMIA LDL GOAL <130: ICD-10-CM

## 2020-03-19 DIAGNOSIS — K21.9 GASTROESOPHAGEAL REFLUX DISEASE WITHOUT ESOPHAGITIS: ICD-10-CM

## 2020-03-19 RX ORDER — SIMVASTATIN 20 MG
20 TABLET ORAL AT BEDTIME
Qty: 90 TABLET | Refills: 0 | Status: SHIPPED | OUTPATIENT
Start: 2020-03-19 | End: 2020-07-15

## 2020-07-07 DIAGNOSIS — Z20.2 EXPOSURE TO STD: ICD-10-CM

## 2020-07-07 DIAGNOSIS — E78.5 HYPERLIPIDEMIA LDL GOAL <130: Primary | ICD-10-CM

## 2020-07-08 RX ORDER — DEXAMETHASONE 0.75 MG/1
TABLET ORAL
Qty: 30 TABLET | Refills: 3 | OUTPATIENT
Start: 2020-07-08

## 2020-07-08 NOTE — TELEPHONE ENCOUNTER
Called patient and advised of below.  He also is due for PE.  He wishes to just schedule PE and address labs and restarting med at visit.  Scheduled PE and med check for 7/15/20 10:10 am.  Jojo Epstein RN

## 2020-07-08 NOTE — TELEPHONE ENCOUNTER
Is due for visit - could do virtual and must get labs prior to restarting and then get them again in 6 months and yearlly

## 2020-07-08 NOTE — TELEPHONE ENCOUNTER
Routing refill request to provider for review/approval because:  Labs not current:  Multiple labs  A break in medication  Norma Beckford RN, BSN

## 2020-07-15 ENCOUNTER — OFFICE VISIT (OUTPATIENT)
Dept: FAMILY MEDICINE | Facility: CLINIC | Age: 45
End: 2020-07-15
Payer: COMMERCIAL

## 2020-07-15 VITALS
OXYGEN SATURATION: 96 % | HEART RATE: 66 BPM | WEIGHT: 186 LBS | TEMPERATURE: 98.1 F | BODY MASS INDEX: 27.47 KG/M2 | DIASTOLIC BLOOD PRESSURE: 74 MMHG | SYSTOLIC BLOOD PRESSURE: 120 MMHG

## 2020-07-15 DIAGNOSIS — Z20.2 EXPOSURE TO STD: ICD-10-CM

## 2020-07-15 DIAGNOSIS — K21.9 GASTROESOPHAGEAL REFLUX DISEASE WITHOUT ESOPHAGITIS: ICD-10-CM

## 2020-07-15 DIAGNOSIS — Z00.00 ROUTINE GENERAL MEDICAL EXAMINATION AT A HEALTH CARE FACILITY: Primary | ICD-10-CM

## 2020-07-15 DIAGNOSIS — E78.5 HYPERLIPIDEMIA LDL GOAL <130: ICD-10-CM

## 2020-07-15 LAB
ERYTHROCYTE [DISTWIDTH] IN BLOOD BY AUTOMATED COUNT: 14.1 % (ref 10–15)
HCT VFR BLD AUTO: 41.7 % (ref 40–53)
HGB BLD-MCNC: 14.6 G/DL (ref 13.3–17.7)
MCH RBC QN AUTO: 31.1 PG (ref 26.5–33)
MCHC RBC AUTO-ENTMCNC: 35 G/DL (ref 31.5–36.5)
MCV RBC AUTO: 89 FL (ref 78–100)
PLATELET # BLD AUTO: 221 10E9/L (ref 150–450)
RBC # BLD AUTO: 4.7 10E12/L (ref 4.4–5.9)
WBC # BLD AUTO: 6.2 10E9/L (ref 4–11)

## 2020-07-15 PROCEDURE — 99396 PREV VISIT EST AGE 40-64: CPT | Performed by: FAMILY MEDICINE

## 2020-07-15 PROCEDURE — 87491 CHLMYD TRACH DNA AMP PROBE: CPT | Performed by: FAMILY MEDICINE

## 2020-07-15 PROCEDURE — 85027 COMPLETE CBC AUTOMATED: CPT | Performed by: FAMILY MEDICINE

## 2020-07-15 PROCEDURE — 36415 COLL VENOUS BLD VENIPUNCTURE: CPT | Performed by: FAMILY MEDICINE

## 2020-07-15 PROCEDURE — 87389 HIV-1 AG W/HIV-1&-2 AB AG IA: CPT | Performed by: FAMILY MEDICINE

## 2020-07-15 PROCEDURE — 87591 N.GONORRHOEAE DNA AMP PROB: CPT | Performed by: FAMILY MEDICINE

## 2020-07-15 PROCEDURE — 80061 LIPID PANEL: CPT | Performed by: FAMILY MEDICINE

## 2020-07-15 PROCEDURE — 80053 COMPREHEN METABOLIC PANEL: CPT | Performed by: FAMILY MEDICINE

## 2020-07-15 PROCEDURE — 87340 HEPATITIS B SURFACE AG IA: CPT | Performed by: FAMILY MEDICINE

## 2020-07-15 PROCEDURE — 86780 TREPONEMA PALLIDUM: CPT | Performed by: FAMILY MEDICINE

## 2020-07-15 RX ORDER — SIMVASTATIN 20 MG
20 TABLET ORAL AT BEDTIME
Qty: 90 TABLET | Refills: 3 | Status: SHIPPED | OUTPATIENT
Start: 2020-07-15 | End: 2020-08-12

## 2020-07-15 RX ORDER — DEXAMETHASONE 0.75 MG/1
1 TABLET ORAL DAILY
Qty: 30 TABLET | Refills: 5 | Status: SHIPPED | OUTPATIENT
Start: 2020-07-15 | End: 2021-07-21

## 2020-07-15 ASSESSMENT — ENCOUNTER SYMPTOMS
HEARTBURN: 0
DIARRHEA: 0
CHILLS: 0
NERVOUS/ANXIOUS: 0
PALPITATIONS: 0
WEAKNESS: 0
DIZZINESS: 0
FEVER: 0
SORE THROAT: 0
EYE PAIN: 0
FREQUENCY: 0
HEADACHES: 0
CONSTIPATION: 0
COUGH: 0
MYALGIAS: 0
ARTHRALGIAS: 0
ABDOMINAL PAIN: 0
SHORTNESS OF BREATH: 0
HEMATURIA: 0
HEMATOCHEZIA: 0
PARESTHESIAS: 0
JOINT SWELLING: 0
DYSURIA: 0
NAUSEA: 0
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
## 2020-07-16 LAB
C TRACH DNA SPEC QL NAA+PROBE: NEGATIVE
HBV SURFACE AG SERPL QL IA: NONREACTIVE
HIV 1+2 AB+HIV1 P24 AG SERPL QL IA: NONREACTIVE
N GONORRHOEA DNA SPEC QL NAA+PROBE: NEGATIVE
SPECIMEN SOURCE: NORMAL
SPECIMEN SOURCE: NORMAL
T PALLIDUM AB SER QL: NONREACTIVE

## 2020-07-17 LAB
ALBUMIN SERPL-MCNC: 4.6 G/DL (ref 3.4–5)
ALP SERPL-CCNC: 42 U/L (ref 40–150)
ALT SERPL W P-5'-P-CCNC: 44 U/L (ref 0–70)
ANION GAP SERPL CALCULATED.3IONS-SCNC: 6 MMOL/L (ref 3–14)
AST SERPL W P-5'-P-CCNC: 21 U/L (ref 0–45)
BILIRUB SERPL-MCNC: 0.7 MG/DL (ref 0.2–1.3)
BUN SERPL-MCNC: 22 MG/DL (ref 7–30)
CALCIUM SERPL-MCNC: 9.1 MG/DL (ref 8.5–10.1)
CHLORIDE SERPL-SCNC: 108 MMOL/L (ref 94–109)
CHOLEST SERPL-MCNC: 177 MG/DL
CO2 SERPL-SCNC: 25 MMOL/L (ref 20–32)
CREAT SERPL-MCNC: 1.21 MG/DL (ref 0.66–1.25)
GFR SERPL CREATININE-BSD FRML MDRD: 72 ML/MIN/{1.73_M2}
GLUCOSE SERPL-MCNC: 104 MG/DL (ref 70–99)
HDLC SERPL-MCNC: 43 MG/DL
LDLC SERPL CALC-MCNC: 100 MG/DL
NONHDLC SERPL-MCNC: 134 MG/DL
POTASSIUM SERPL-SCNC: 4.6 MMOL/L (ref 3.4–5.3)
PROT SERPL-MCNC: 8.1 G/DL (ref 6.8–8.8)
SODIUM SERPL-SCNC: 139 MMOL/L (ref 133–144)
TRIGL SERPL-MCNC: 169 MG/DL

## 2020-08-12 ENCOUNTER — TELEPHONE (OUTPATIENT)
Dept: FAMILY MEDICINE | Facility: CLINIC | Age: 45
End: 2020-08-12

## 2020-08-12 DIAGNOSIS — E78.5 HYPERLIPIDEMIA LDL GOAL <130: ICD-10-CM

## 2020-08-12 RX ORDER — SIMVASTATIN 20 MG
20 TABLET ORAL AT BEDTIME
Qty: 90 TABLET | Refills: 3 | Status: SHIPPED | OUTPATIENT
Start: 2020-08-12 | End: 2021-07-21

## 2020-08-12 RX ORDER — SIMVASTATIN 20 MG
20 TABLET ORAL AT BEDTIME
Qty: 90 TABLET | Refills: 3 | OUTPATIENT
Start: 2020-08-12

## 2020-11-22 ENCOUNTER — HEALTH MAINTENANCE LETTER (OUTPATIENT)
Age: 45
End: 2020-11-22

## 2021-04-10 DIAGNOSIS — K21.9 GASTROESOPHAGEAL REFLUX DISEASE WITHOUT ESOPHAGITIS: ICD-10-CM

## 2021-04-12 NOTE — TELEPHONE ENCOUNTER
Request refused, patient given year supply in July of 2020, will be due for visit before further refills in July 2021    Omid Vieira RN

## 2021-04-16 ENCOUNTER — TELEPHONE (OUTPATIENT)
Dept: FAMILY MEDICINE | Facility: CLINIC | Age: 46
End: 2021-04-16

## 2021-04-16 DIAGNOSIS — K21.9 GASTROESOPHAGEAL REFLUX DISEASE WITHOUT ESOPHAGITIS: ICD-10-CM

## 2021-04-16 NOTE — TELEPHONE ENCOUNTER
Received call from pt  He switched mail order pharmacies    3 month supply of omeprazole sent to pt's new [facility]  Advised pt that he is due for an office visit    Pt verbalized understanding and agrees to the plan    Thank you  Codey Lassiter RN on 4/16/2021 at 10:51 AM

## 2021-07-21 ENCOUNTER — OFFICE VISIT (OUTPATIENT)
Dept: FAMILY MEDICINE | Facility: CLINIC | Age: 46
End: 2021-07-21
Payer: COMMERCIAL

## 2021-07-21 VITALS
HEART RATE: 97 BPM | WEIGHT: 186.4 LBS | TEMPERATURE: 98.4 F | SYSTOLIC BLOOD PRESSURE: 121 MMHG | BODY MASS INDEX: 27.61 KG/M2 | OXYGEN SATURATION: 95 % | HEIGHT: 69 IN | DIASTOLIC BLOOD PRESSURE: 82 MMHG

## 2021-07-21 DIAGNOSIS — Z83.3 FAMILY HISTORY OF DIABETES MELLITUS: ICD-10-CM

## 2021-07-21 DIAGNOSIS — Z20.2 EXPOSURE TO STD: ICD-10-CM

## 2021-07-21 DIAGNOSIS — E78.5 HYPERLIPIDEMIA LDL GOAL <130: ICD-10-CM

## 2021-07-21 DIAGNOSIS — G25.81 RESTLESS LEGS SYNDROME (RLS): ICD-10-CM

## 2021-07-21 DIAGNOSIS — Z12.11 SPECIAL SCREENING FOR MALIGNANT NEOPLASMS, COLON: ICD-10-CM

## 2021-07-21 DIAGNOSIS — K21.9 GASTROESOPHAGEAL REFLUX DISEASE WITHOUT ESOPHAGITIS: ICD-10-CM

## 2021-07-21 DIAGNOSIS — Z00.00 ROUTINE GENERAL MEDICAL EXAMINATION AT A HEALTH CARE FACILITY: Primary | ICD-10-CM

## 2021-07-21 LAB
ERYTHROCYTE [DISTWIDTH] IN BLOOD BY AUTOMATED COUNT: 13.4 % (ref 10–15)
HBA1C MFR BLD: 5.5 % (ref 0–5.6)
HCT VFR BLD AUTO: 41.6 % (ref 40–53)
HGB BLD-MCNC: 14.7 G/DL (ref 13.3–17.7)
MCH RBC QN AUTO: 31 PG (ref 26.5–33)
MCHC RBC AUTO-ENTMCNC: 35.3 G/DL (ref 31.5–36.5)
MCV RBC AUTO: 88 FL (ref 78–100)
PLATELET # BLD AUTO: 238 10E3/UL (ref 150–450)
RBC # BLD AUTO: 4.74 10E6/UL (ref 4.4–5.9)
WBC # BLD AUTO: 7.7 10E3/UL (ref 4–11)

## 2021-07-21 PROCEDURE — 82728 ASSAY OF FERRITIN: CPT | Performed by: FAMILY MEDICINE

## 2021-07-21 PROCEDURE — 80061 LIPID PANEL: CPT | Performed by: FAMILY MEDICINE

## 2021-07-21 PROCEDURE — 99396 PREV VISIT EST AGE 40-64: CPT | Performed by: FAMILY MEDICINE

## 2021-07-21 PROCEDURE — 87491 CHLMYD TRACH DNA AMP PROBE: CPT | Performed by: FAMILY MEDICINE

## 2021-07-21 PROCEDURE — 86706 HEP B SURFACE ANTIBODY: CPT | Performed by: FAMILY MEDICINE

## 2021-07-21 PROCEDURE — 80053 COMPREHEN METABOLIC PANEL: CPT | Performed by: FAMILY MEDICINE

## 2021-07-21 PROCEDURE — 83036 HEMOGLOBIN GLYCOSYLATED A1C: CPT | Performed by: FAMILY MEDICINE

## 2021-07-21 PROCEDURE — 87591 N.GONORRHOEAE DNA AMP PROB: CPT | Performed by: FAMILY MEDICINE

## 2021-07-21 PROCEDURE — 36415 COLL VENOUS BLD VENIPUNCTURE: CPT | Performed by: FAMILY MEDICINE

## 2021-07-21 PROCEDURE — 85027 COMPLETE CBC AUTOMATED: CPT | Performed by: FAMILY MEDICINE

## 2021-07-21 PROCEDURE — 86803 HEPATITIS C AB TEST: CPT | Performed by: FAMILY MEDICINE

## 2021-07-21 PROCEDURE — 86780 TREPONEMA PALLIDUM: CPT | Performed by: FAMILY MEDICINE

## 2021-07-21 PROCEDURE — 87389 HIV-1 AG W/HIV-1&-2 AB AG IA: CPT | Performed by: FAMILY MEDICINE

## 2021-07-21 RX ORDER — EMTRICITABINE AND TENOFOVIR DISOPROXIL FUMARATE 200; 300 MG/1; MG/1
1 TABLET, FILM COATED ORAL DAILY
Qty: 30 EACH | Refills: 5 | Status: SHIPPED | OUTPATIENT
Start: 2021-07-21

## 2021-07-21 RX ORDER — SIMVASTATIN 20 MG
20 TABLET ORAL AT BEDTIME
Qty: 90 TABLET | Refills: 3 | Status: SHIPPED | OUTPATIENT
Start: 2021-07-21 | End: 2022-06-16

## 2021-07-21 RX ORDER — ROPINIROLE 0.5 MG/1
TABLET, FILM COATED ORAL
Qty: 180 TABLET | Refills: 3 | Status: SHIPPED | OUTPATIENT
Start: 2021-07-21 | End: 2022-06-16

## 2021-07-21 ASSESSMENT — ENCOUNTER SYMPTOMS
HEMATURIA: 0
ABDOMINAL PAIN: 0
DIZZINESS: 0
COUGH: 0
EYE PAIN: 0
FREQUENCY: 0
DIARRHEA: 0
ARTHRALGIAS: 0
HEADACHES: 0
HEMATOCHEZIA: 0
WEAKNESS: 0
DYSURIA: 0
SHORTNESS OF BREATH: 0
MYALGIAS: 0
JOINT SWELLING: 0
CHILLS: 0
HEARTBURN: 0
SORE THROAT: 0
PARESTHESIAS: 0
FEVER: 0
NERVOUS/ANXIOUS: 0
PALPITATIONS: 0
NAUSEA: 0
CONSTIPATION: 0

## 2021-07-21 ASSESSMENT — MIFFLIN-ST. JEOR: SCORE: 1720.88

## 2021-07-21 NOTE — PROGRESS NOTES
3  SUBJECTIVE:   CC: Ezekiel Barber is an 46 year old male who presents for preventive health visit.     He is doing well.   He needs refills of his meds.   He does have restless legs syndrome.   He has to take a prn medication for this now 6 nights per week where before it was 1-2 nights per week.    He does not routinely take this.   It still always works.   He does not have side effects.       Patient has been advised of split billing requirements and indicates understanding: Yes  Healthy Habits:  Answers for HPI/ROS submitted by the patient on 7/21/2021  Getting at least 3 servings of Calcium per day:: Yes  Diet:: Regular (no restrictions)  Taking medications regularly:: Yes  Medication side effects:: None  Bi-annual eye exam:: Yes  Dental care twice a year:: NO  Sleep apnea or symptoms of sleep apnea:: None  abdominal pain: No  Blood in stool: No  Blood in urine: No  chest pain: No  chills: No  congestion: No  constipation: No  cough: No  diarrhea: No  dizziness: No  ear pain: No  eye pain: No  nervous/anxious: No  fever: No  frequency: No  genital sores: No  headaches: No  hearing loss: No  heartburn: No  arthralgias: No  joint swelling: No  peripheral edema: No  mood changes: No  myalgias: No  nausea: No  dysuria: No  palpitations: No  Skin sensation changes: No  sore throat: No  urgency: No  rash: No  shortness of breath: No  visual disturbance: No  weakness: No  impotence: No  penile discharge: No  Duration of exercise:: 15-30 minutes      Past Medical History:   Diagnosis Date     Chronic GERD 2001    tx with prilosec - will get EGD postcovid     Detached retina, right 2012    back corner - no visual loss - found on eye exam     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     Lipoma 02/26/2018    jelly bean sized left forearm     NONSPECIFIC MEDICAL HISTORY 1994    fractured mandible and had it wired shut     Peyronie disease 03/2019     Pure hypercholesterolemia 2001     Taking medication for  chronic disease 2020    TRUVADA - check every 6 month HIV and BMP and yearly CMP, HIV, GC, RPR, HepBsAg and lipid panel       Past Surgical History:   Procedure Laterality Date     SURGICAL HISTORY OF -   1994    fractured mandible     SURGICAL HISTORY OF -   4/2013    left eye lazer surgery for detached retina       MEDICATIONS:  Current Outpatient Medications   Medication     emtricitabine-tenofovir (TRUVADA) 200-300 MG per tablet     omeprazole (PRILOSEC) 20 MG DR capsule     rOPINIRole (REQUIP) 0.5 MG tablet     simvastatin (ZOCOR) 20 MG tablet     hydrocortisone (ANUSOL-HC) 25 MG suppository     Multiple Vitamins-Minerals (MULTIVITAMIN & MINERAL PO)     No current facility-administered medications for this visit.       SOCIAL HISTORY:  Social History     Tobacco Use     Smoking status: Never Smoker     Smokeless tobacco: Never Used     Tobacco comment: vapes - rarely   Substance Use Topics     Alcohol use: Yes     Comment: RARELY       Family History   Problem Relation Age of Onset     Lipids Mother      Neurologic Disorder Mother         multiple sclerosis     Lipids Father      Genitourinary Problems Father         kidney stone     Thyroid Disease Father         hyperthyroidism     Nephrolithiasis Father      Hypertension Maternal Grandmother      Gastrointestinal Disease Maternal Grandmother         diverticulitis     Hypertension Maternal Grandfather      Diabetes Maternal Grandfather      C.A.D. Paternal Grandfather         in his 60's     Diabetes Paternal Grandfather          Today's PHQ-2 Score:   PHQ-2 ( 1999 Pfizer) 7/21/2021 7/8/2021   Q1: Little interest or pleasure in doing things 0 0   Q2: Feeling down, depressed or hopeless 0 0   PHQ-2 Score 0 0   Q1: Little interest or pleasure in doing things Not at all Not at all   Q2: Feeling down, depressed or hopeless Not at all Not at all   PHQ-2 Score 0 0       Abuse: Current or Past(Physical, Sexual or Emotional)- No  Do you feel safe in your  environment? Yes    Have you ever done Advance Care Planning? (For example, a Health Directive, POLST, or a discussion with a medical provider or your loved ones about your wishes): Yes, patient states has an Advance Care Planning document and will bring a copy to the clinic.    Social History     Tobacco Use     Smoking status: Never Smoker     Smokeless tobacco: Never Used     Tobacco comment: vapes - rarely   Substance Use Topics     Alcohol use: Yes     Comment: RARELY     If you drink alcohol do you typically have >3 drinks per day or >7 drinks per week? No                      Last PSA: No results found for: PSA    Reviewed orders with patient. Reviewed health maintenance and updated orders accordingly - Yes  Labs reviewed in EPIC    Past Medical History:   Diagnosis Date     Chronic GERD 2001    tx with prilosec - will get EGD postcovid     Detached retina, right 2012    back corner - no visual loss - found on eye exam     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     Lipoma 02/26/2018    jelly bean sized left forearm     NONSPECIFIC MEDICAL HISTORY 1994    fractured mandible and had it wired shut     Peyronie disease 03/2019     Pure hypercholesterolemia 2001     Taking medication for chronic disease 2020    TRUVADA - check every 6 month HIV and BMP and yearly CMP, HIV, GC, RPR, HepBsAg and lipid panel       Past Surgical History:   Procedure Laterality Date     SURGICAL HISTORY OF -   1994    fractured mandible     SURGICAL HISTORY OF -   4/2013    left eye lazer surgery for detached retina       MEDICATIONS:  Current Outpatient Medications   Medication     hydrocortisone (ANUSOL-HC) 25 MG suppository     Multiple Vitamins-Minerals (MULTIVITAMIN & MINERAL PO)     omeprazole (PRILOSEC) 20 MG DR capsule     rOPINIRole (REQUIP) 0.5 MG tablet     simvastatin (ZOCOR) 20 MG tablet     No current facility-administered medications for this visit.       SOCIAL HISTORY:  Social History     Tobacco Use      Smoking status: Never Smoker     Smokeless tobacco: Never Used     Tobacco comment: vapes - rarely   Substance Use Topics     Alcohol use: Yes     Comment: RARELY       Family History   Problem Relation Age of Onset     Lipids Mother      Neurologic Disorder Mother         multiple sclerosis     Lipids Father      Genitourinary Problems Father         kidney stone     Thyroid Disease Father         hyperthyroidism     Nephrolithiasis Father      Hypertension Maternal Grandmother      Gastrointestinal Disease Maternal Grandmother         diverticulitis     Hypertension Maternal Grandfather      Diabetes Maternal Grandfather      C.A.D. Paternal Grandfather         in his 60's     Diabetes Paternal Grandfather        Reviewed and updated as needed this visit by Provider                Past Medical History:   Diagnosis Date     Chronic GERD 2001    tx with prilosec - will get EGD postcovid     Detached retina, right 2012    back corner - no visual loss - found on eye exam     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     Lipoma 02/26/2018    jelly bean sized left forearm     NONSPECIFIC MEDICAL HISTORY 1994    fractured mandible and had it wired shut     Peyronie disease 03/2019     Pure hypercholesterolemia 2001     Taking medication for chronic disease 2020    TRUVADA - check every 6 month HIV and BMP and yearly CMP, HIV, GC, RPR, HepBsAg and lipid panel       Past Surgical History:   Procedure Laterality Date     SURGICAL HISTORY OF -   1994    fractured mandible     SURGICAL HISTORY OF -   4/2013    left eye lazer surgery for detached retina       MEDICATIONS:  Current Outpatient Medications   Medication     hydrocortisone (ANUSOL-HC) 25 MG suppository     Multiple Vitamins-Minerals (MULTIVITAMIN & MINERAL PO)     omeprazole (PRILOSEC) 20 MG DR capsule     rOPINIRole (REQUIP) 0.5 MG tablet     simvastatin (ZOCOR) 20 MG tablet     No current facility-administered medications for this visit.       SOCIAL  "HISTORY:  Social History     Tobacco Use     Smoking status: Never Smoker     Smokeless tobacco: Never Used     Tobacco comment: vapes - rarely   Substance Use Topics     Alcohol use: Yes     Comment: RARELY       Family History   Problem Relation Age of Onset     Lipids Mother      Neurologic Disorder Mother         multiple sclerosis     Lipids Father      Genitourinary Problems Father         kidney stone     Thyroid Disease Father         hyperthyroidism     Nephrolithiasis Father      Hypertension Maternal Grandmother      Gastrointestinal Disease Maternal Grandmother         diverticulitis     Hypertension Maternal Grandfather      Diabetes Maternal Grandfather      C.A.D. Paternal Grandfather         in his 60's     Diabetes Paternal Grandfather          ROS:  CONSTITUTIONAL: NEGATIVE for fever, chills, change in weight  INTEGUMENTARY/SKIN: NEGATIVE for worrisome rashes, moles or lesions  EYES: NEGATIVE for vision changes or irritation  ENT: NEGATIVE for ear, mouth and throat problems  RESP: NEGATIVE for significant cough or SOB  CV: NEGATIVE for chest pain, palpitations or peripheral edema  GI: NEGATIVE for nausea, abdominal pain, heartburn, or change in bowel habits   male: negative for dysuria, hematuria, decreased urinary stream, erectile dysfunction, urethral discharge  MUSCULOSKELETAL: NEGATIVE for significant arthralgias or myalgia  NEURO: NEGATIVE for weakness, dizziness or paresthesias  PSYCHIATRIC: NEGATIVE for changes in mood or affect    OBJECTIVE:   /82 (BP Location: Right arm, Patient Position: Sitting, Cuff Size: Adult Large)   Pulse 97   Temp 98.4  F (36.9  C) (Oral)   Ht 1.753 m (5' 9\")   Wt 84.6 kg (186 lb 6.4 oz)   SpO2 95%   BMI 27.53 kg/m    EXAM:  GENERAL: healthy, alert and no distress  EYES: Eyes grossly normal to inspection, PERRL and conjunctivae and sclerae normal  HENT: ear canals and TM's normal, nose and mouth without ulcers or lesions  NECK: no adenopathy, no " asymmetry, masses, or scars and thyroid normal to palpation  RESP: lungs clear to auscultation - no rales, rhonchi or wheezes  CV: regular rate and rhythm, normal S1 S2, no S3 or S4, no murmur, click or rub, no peripheral edema and peripheral pulses strong  ABDOMEN: soft, nontender, no hepatosplenomegaly, no masses and bowel sounds normal  MS: no gross musculoskeletal defects noted, no edema  SKIN: no suspicious lesions or rashes  NEURO: Normal strength and tone, mentation intact and speech normal  PSYCH: mentation appears normal, affect normal/bright  LYMPH: no cervical, supraclavicular, axillary, or inguinal adenopathy    Diagnostic Test Results:  Labs reviewed in Epic    ASSESSMENT/PLAN:   1. Routine general medical examination at a health care facility    - CBC with platelets  - Lipid panel reflex to direct LDL Non-fasting  - Comprehensive metabolic panel (BMP + Alb, Alk Phos, ALT, AST, Total. Bili, TP)  - Adult Gastro Ref - Procedure Only; Future  - Hepatitis B Surface Antibody    2. Family history of diabetes mellitus    - Comprehensive metabolic panel (BMP + Alb, Alk Phos, ALT, AST, Total. Bili, TP)  - Hemoglobin A1c    3. Hyperlipidemia LDL goal <130  under good control  Refills per [facility]    - simvastatin (ZOCOR) 20 MG tablet; Take 1 tablet (20 mg) by mouth At Bedtime  Dispense: 90 tablet; Refill: 3  - Lipid panel reflex to direct LDL Non-fasting  - Comprehensive metabolic panel (BMP + Alb, Alk Phos, ALT, AST, Total. Bili, TP)    4. Restless legs syndrome (RLS)  Take regularly before bed and not prn for at least a month and then try to go back to prn    - rOPINIRole (REQUIP) 0.5 MG tablet; TAKE TWO TABLETS BY MOUTH NIGHTLY AS NEEDED  Dispense: 180 tablet; Refill: 3  - CBC with platelets  - Ferritin    5. Exposure to STD  Refills per [facility]    - emtricitabine-tenofovir (TRUVADA) 200-300 MG per tablet; Take 1 tablet by mouth daily  Dispense: 30 each; Refill: 5  - Treponema Abs w Reflex to RPR and Titer  -  "HIV Antigen Antibody Combo  - NEISSERIA GONORRHOEA PCR  - CHLAMYDIA TRACHOMATIS PCR  - Hepatitis B Surface Antibody    6. Gastroesophageal reflux disease without esophagitis  Refills per [facility]  Time for screening EGD due to chronic gastroesophageal reflux disease     - omeprazole (PRILOSEC) 20 MG DR capsule; Take 2 capsules (40 mg) by mouth daily  Dispense: 180 capsule; Refill: 3  - Adult Gastro Ref - Procedure Only; Future    7. Special screening for malignant neoplasms, colon  A screening colonoscopy was recommended and referral offered to the patient.  - Adult Gastro Ref - Procedure Only; Future    Patient has been advised of split billing requirements and indicates understanding: Yes  COUNSELING:  Reviewed preventive health counseling, as reflected in patient instructions  Special attention given to:        Immunizations    Has had covid vaccine          Estimated body mass index is 27.53 kg/m  as calculated from the following:    Height as of this encounter: 1.753 m (5' 9\").    Weight as of this encounter: 84.6 kg (186 lb 6.4 oz).        He reports that he has never smoked. He has never used smokeless tobacco.      Counseling Resources:  ATP IV Guidelines  Pooled Cohorts Equation Calculator  FRAX Risk Assessment  ICSI Preventive Guidelines  Dietary Guidelines for Americans, 2010  [employer]'s MyPlate  ASA Prophylaxis  Lung CA Screening    Coty Alegre MD  [facility]"

## 2021-07-21 NOTE — PATIENT INSTRUCTIONS
Preventive Health Recommendations  Male Ages 40 to 49    Yearly exam:             See your health care provider every year in order to  o   Review health changes.   o   Discuss preventive care.    o   Review your medicines if your doctor has prescribed any.    You should be tested each year for STDs (sexually transmitted diseases) if you re at risk.     Have a cholesterol test every 5 years.     Have a colonoscopy (test for colon cancer) if someone in your family has had colon cancer or polyps before age 50.     After age 45, have a diabetes test (fasting glucose). If you are at risk for diabetes, you should have this test every 3 years.      Talk with your health care provider about whether or not a prostate cancer screening test (PSA) is right for you.    Shots: Get a flu shot each year. Get a tetanus shot every 10 years.     Nutrition:    Eat at least 5 servings of fruits and vegetables daily.     Eat whole-grain bread, whole-wheat pasta and brown rice instead of white grains and rice.     Get adequate Calcium and Vitamin D.     Lifestyle    Exercise for at least 150 minutes a week (30 minutes a day, 5 days a week). This will help you control your weight and prevent disease.     Limit alcohol to one drink per day.     No smoking.     Wear sunscreen to prevent skin cancer.     See your dentist every six months for an exam and cleaning.      Patient Education     Understanding Restless Legs Syndrome    Are you ever annoyed by a creeping or itching feeling in your legs? Do you often feel an urge to move your legs while sitting or lying in bed? This can keep you from falling asleep at night. You may then feel tired during the day. If you have these problems, talk to your healthcare provider. He or she can suggest a treatment plan and help you find ways to sleep better.   Restless legs syndrome (RLS)  RLS is a creeping, crawly, or jumpy feeling in the legs. It makes you want to move them. Symptoms of RLS often occur  when you aren't active. This discomfort can keep you from falling asleep. RLS is more common in older people and tends to run in families. Overuse of caffeine or alcohol may make symptoms worse. Iron deficiency, diabetes, or kidney problems can contribute to RLS. In more severe cases, you may also have symptoms in your arms.   Periodic limb movement of sleep (PLMS)   PLMS is sudden, repetitive leg jerking during sleep. The person you sleep with is often the one who notices it. Your legs may jerk many times during the night. You and your partner may both have trouble sleeping and feel tired in the morning. PLMS shouldn t be confused with the normal leg or body twitching many people have when first falling asleep. Many people with RLS also have PLMS.   Treating these problems  If RLS causes poor sleep and daytime symptoms, you may need help. Options include:     Not using medicines for depression and nausea.    Taking iron pills    Using prescribed medicines for RLS.    Making lifestyle changes like exercise. Also limit caffeine and alcohol. And, don't smoke.  [employer] last reviewed this educational content on 3/1/2020    1157-5125 [employer]. All rights reserved. This information is not intended as a substitute for professional medical care. Always follow your healthcare professional's instructions.           Patient Education     Restless Legs Syndrome: What You Can Do    Symptoms of restless leg syndrome (RLS) can be treated. Together, you and your healthcare provider can work on your treatment plan. If needed, medicines may be prescribed. Also learn what you can do to ease your discomfort. Good sleep habits and a healthy lifestyle will help you rest better at night and have more energy during the day.   Working with your healthcare provider  RLS may occur on its own and may be passed on in families. It's sometimes linked to other medical problems. Low iron may cause some RLS symptoms. Your  healthcare provider may order a lab test to check your iron level. You may be tested for other medical problems linked to RLS. These include kidney disease, diabetes, Parkinson disease, and multiple sclerosis. Your doctor may prescribe medicines to reduce your symptoms and help you sleep better.   Tips for temporary relief  To reduce your discomfort, try the following:    Walking or stretching    Rubbing your legs    Having a massage    Taking a hot or cold bath    Doing activities that make muscles in your hands or legs work    Relaxing with yoga or meditation  Good sleep habits  Even though you have RLS, you can still have restful sleep. Try these good sleeping habits:     Keep a regular sleep schedule. Go to bed and get up at the same time each day.    Don't take naps or limit napping.    Make sure the bedroom is quiet, dark, and not too hot or too cold.    Use your bed only for sleep and sex.  Healthy lifestyle  Your lifestyle affects your health and your sleep. Here are some healthy habits:    Eat a balanced diet. To get enough vitamins and minerals, you may also need to take supplements.    Manage stress and learn ways to relax. Deep breathing techniques and visualization can help to relax your muscles and calm your mind.    Exercise regularly. It can help reduce stress. Also, you will have more energy during the day and be more tired at bedtime. Afternoon exercise is best. Nighttime exercise may affect how well you sleep.  [employer] last reviewed this educational content on 3/1/2020    7701-5430 [employer]. All rights reserved. This information is not intended as a substitute for professional medical care. Always follow your healthcare professional's instructions.           Patient Education     Benign Paroxysmal Positional Vertigo     Your health care provider may move your head in certain ways to treat your BPPV.   Benign paroxysmal positional vertigo (BPPV) is a problem with the inner ear.  The inner ear contains the vestibular system. This system is what helps you keep your balance. BPPV causes a feeling of spinning. It is a common problem of the vestibular system.   Understanding the vestibular system  The vestibular system of the ear is made up of very tiny parts. They include the utricle, saccule, and semicircular canals. The utricle is a tiny organ that contains calcium crystals. In some people, the crystals can move into the semicircular canals. When this happens, the system no longer works as it should. This causes BPPV. Benign means it is not life threatening. Paroxysmal means it happens suddenly. Positional means that it happens when you move your head. Vertigo is a feeling of spinning.   What causes BPPV?  Causes include injury to your head or neck. Other problems with the vestibular system may cause BPPV. In many people, the cause of BPPV is not known.   Symptoms of BPPV  You may have repeated feelings of spinning (vertigo). The vertigo usually lasts less than 1 minute. Some movements, such as rolling over in bed, can bring on vertigo.   Diagnosing BPPV  Your primary healthcare provider may diagnose and treat your BPPV. Or you may see an ear, nose, and throat healthcare provider (otolaryngologist). In some cases, you may see a healthcare provider who focuses on the nervous system (neurologist).   The healthcare provider will ask about your symptoms and your medical history. He or she will examine you. You may have hearing and balance tests. As part of the exam, your healthcare provider may have you move your head and body in certain ways. If you have BPPV, the movements can bring on vertigo. Your provider will also look for abnormal movements of your eyes. You may have other tests to check your vestibular or nervous systems.   Treatment for BPPV  Your healthcare provider may try to move the calcium crystals. This is done by having you move your head and neck in certain ways. This treatment  is safe and often works well. You may also be told to do these movements at home. You may still have vertigo for a few weeks. Your healthcare provider will recheck your symptoms, usually in about a month. Special physical therapy may also be part of treatment. In rare cases, surgery may be needed for BPPV that does not go away. Talk with your healthcare provider about whether it is safe for you to drive.   When to call the healthcare provider  Call your healthcare provider right away if you have any of these:    Symptoms that do not go away with treatment    Symptoms that get worse    New symptoms  [employer] last reviewed this educational content on 2/1/2020 2000-2021 [employer]. All rights reserved. This information is not intended as a substitute for professional medical care. Always follow your healthcare professional's instructions.

## 2021-07-22 LAB
ALBUMIN SERPL-MCNC: 4.5 G/DL (ref 3.4–5)
ALP SERPL-CCNC: 46 U/L (ref 40–150)
ALT SERPL W P-5'-P-CCNC: 31 U/L (ref 0–70)
ANION GAP SERPL CALCULATED.3IONS-SCNC: 6 MMOL/L (ref 3–14)
AST SERPL W P-5'-P-CCNC: 18 U/L (ref 0–45)
BILIRUB SERPL-MCNC: 0.6 MG/DL (ref 0.2–1.3)
BUN SERPL-MCNC: 17 MG/DL (ref 7–30)
CALCIUM SERPL-MCNC: 9.4 MG/DL (ref 8.5–10.1)
CHLORIDE BLD-SCNC: 106 MMOL/L (ref 94–109)
CHOLEST SERPL-MCNC: 188 MG/DL
CO2 SERPL-SCNC: 28 MMOL/L (ref 20–32)
CREAT SERPL-MCNC: 1.09 MG/DL (ref 0.66–1.25)
FASTING STATUS PATIENT QL REPORTED: NO
FERRITIN SERPL-MCNC: 76 NG/ML (ref 26–388)
GFR SERPL CREATININE-BSD FRML MDRD: 82 ML/MIN/1.73M2
GLUCOSE BLD-MCNC: 125 MG/DL (ref 70–99)
HBV SURFACE AB SERPL IA-ACNC: 246.67 M[IU]/ML
HCV AB SERPL QL IA: NONREACTIVE
HDLC SERPL-MCNC: 47 MG/DL
HIV 1+2 AB+HIV1 P24 AG SERPL QL IA: NONREACTIVE
LDLC SERPL CALC-MCNC: 82 MG/DL
NONHDLC SERPL-MCNC: 141 MG/DL
POTASSIUM BLD-SCNC: 4.6 MMOL/L (ref 3.4–5.3)
PROT SERPL-MCNC: 7.8 G/DL (ref 6.8–8.8)
SODIUM SERPL-SCNC: 140 MMOL/L (ref 133–144)
T PALLIDUM AB SER QL: NONREACTIVE
TRIGL SERPL-MCNC: 296 MG/DL

## 2021-07-23 LAB
C TRACH DNA SPEC QL NAA+PROBE: NEGATIVE
N GONORRHOEA DNA SPEC QL NAA+PROBE: NEGATIVE

## 2021-07-27 DIAGNOSIS — Z11.59 ENCOUNTER FOR SCREENING FOR OTHER VIRAL DISEASES: ICD-10-CM

## 2021-08-30 ENCOUNTER — LAB (OUTPATIENT)
Dept: LAB | Facility: CLINIC | Age: 46
End: 2021-08-30
Attending: INTERNAL MEDICINE
Payer: COMMERCIAL

## 2021-08-30 DIAGNOSIS — Z11.59 ENCOUNTER FOR SCREENING FOR OTHER VIRAL DISEASES: ICD-10-CM

## 2021-08-30 PROCEDURE — U0003 INFECTIOUS AGENT DETECTION BY NUCLEIC ACID (DNA OR RNA); SEVERE ACUTE RESPIRATORY SYNDROME CORONAVIRUS 2 (SARS-COV-2) (CORONAVIRUS DISEASE [COVID-19]), AMPLIFIED PROBE TECHNIQUE, MAKING USE OF HIGH THROUGHPUT TECHNOLOGIES AS DESCRIBED BY CMS-2020-01-R: HCPCS

## 2021-08-30 PROCEDURE — U0005 INFEC AGEN DETEC AMPLI PROBE: HCPCS

## 2021-08-31 LAB — SARS-COV-2 RNA RESP QL NAA+PROBE: NEGATIVE

## 2021-09-03 ENCOUNTER — HOSPITAL ENCOUNTER (OUTPATIENT)
Facility: CLINIC | Age: 46
Discharge: HOME | End: 2021-09-03
Attending: INTERNAL MEDICINE | Admitting: INTERNAL MEDICINE
Payer: COMMERCIAL

## 2021-09-03 VITALS
RESPIRATION RATE: 12 BRPM | SYSTOLIC BLOOD PRESSURE: 106 MMHG | DIASTOLIC BLOOD PRESSURE: 71 MMHG | OXYGEN SATURATION: 94 % | TEMPERATURE: 97 F | HEIGHT: 69 IN | HEART RATE: 73 BPM | WEIGHT: 174 LBS | BODY MASS INDEX: 25.77 KG/M2

## 2021-09-03 LAB
COLONOSCOPY: NORMAL
UPPER GI ENDOSCOPY: NORMAL

## 2021-09-03 PROCEDURE — 250N000011 HC RX IP 250 OP 636: Performed by: INTERNAL MEDICINE

## 2021-09-03 PROCEDURE — 250N000009 HC RX 250: Performed by: INTERNAL MEDICINE

## 2021-09-03 PROCEDURE — G0121 COLON CA SCRN NOT HI RSK IND: HCPCS | Performed by: INTERNAL MEDICINE

## 2021-09-03 PROCEDURE — 45378 DIAGNOSTIC COLONOSCOPY: CPT | Performed by: INTERNAL MEDICINE

## 2021-09-03 PROCEDURE — G0500 MOD SEDAT ENDO SERVICE >5YRS: HCPCS | Performed by: INTERNAL MEDICINE

## 2021-09-03 PROCEDURE — 99153 MOD SED SAME PHYS/QHP EA: CPT | Performed by: INTERNAL MEDICINE

## 2021-09-03 PROCEDURE — 250N000013 HC RX MED GY IP 250 OP 250 PS 637: Performed by: INTERNAL MEDICINE

## 2021-09-03 PROCEDURE — 43235 EGD DIAGNOSTIC BRUSH WASH: CPT | Performed by: INTERNAL MEDICINE

## 2021-09-03 RX ORDER — NALOXONE HYDROCHLORIDE 0.4 MG/ML
0.2 INJECTION, SOLUTION INTRAMUSCULAR; INTRAVENOUS; SUBCUTANEOUS
Status: DISCONTINUED | OUTPATIENT
Start: 2021-09-03 | End: 2021-09-03 | Stop reason: HOSPADM

## 2021-09-03 RX ORDER — ONDANSETRON 2 MG/ML
4 INJECTION INTRAMUSCULAR; INTRAVENOUS EVERY 6 HOURS PRN
Status: DISCONTINUED | OUTPATIENT
Start: 2021-09-03 | End: 2021-09-03 | Stop reason: HOSPADM

## 2021-09-03 RX ORDER — NALOXONE HYDROCHLORIDE 0.4 MG/ML
0.4 INJECTION, SOLUTION INTRAMUSCULAR; INTRAVENOUS; SUBCUTANEOUS
Status: DISCONTINUED | OUTPATIENT
Start: 2021-09-03 | End: 2021-09-03 | Stop reason: HOSPADM

## 2021-09-03 RX ORDER — ONDANSETRON 4 MG/1
4 TABLET, ORALLY DISINTEGRATING ORAL EVERY 6 HOURS PRN
Status: DISCONTINUED | OUTPATIENT
Start: 2021-09-03 | End: 2021-09-03 | Stop reason: HOSPADM

## 2021-09-03 RX ORDER — ONDANSETRON 2 MG/ML
4 INJECTION INTRAMUSCULAR; INTRAVENOUS
Status: DISCONTINUED | OUTPATIENT
Start: 2021-09-03 | End: 2021-09-03 | Stop reason: HOSPADM

## 2021-09-03 RX ORDER — FLUMAZENIL 0.1 MG/ML
0.2 INJECTION, SOLUTION INTRAVENOUS
Status: DISCONTINUED | OUTPATIENT
Start: 2021-09-03 | End: 2021-09-03 | Stop reason: HOSPADM

## 2021-09-03 RX ORDER — SIMETHICONE 40MG/0.6ML
SUSPENSION, DROPS(FINAL DOSAGE FORM)(ML) ORAL PRN
Status: COMPLETED | OUTPATIENT
Start: 2021-09-03 | End: 2021-09-03

## 2021-09-03 RX ORDER — FENTANYL CITRATE 50 UG/ML
50 INJECTION, SOLUTION INTRAMUSCULAR; INTRAVENOUS
Status: COMPLETED | OUTPATIENT
Start: 2021-09-03 | End: 2021-09-03

## 2021-09-03 RX ORDER — FENTANYL CITRATE 50 UG/ML
25 INJECTION, SOLUTION INTRAMUSCULAR; INTRAVENOUS EVERY 5 MIN PRN
Status: DISCONTINUED | OUTPATIENT
Start: 2021-09-03 | End: 2021-09-03 | Stop reason: HOSPADM

## 2021-09-03 RX ORDER — PROCHLORPERAZINE MALEATE 10 MG
10 TABLET ORAL EVERY 6 HOURS PRN
Status: DISCONTINUED | OUTPATIENT
Start: 2021-09-03 | End: 2021-09-03 | Stop reason: HOSPADM

## 2021-09-03 RX ORDER — LIDOCAINE 40 MG/G
CREAM TOPICAL
Status: DISCONTINUED | OUTPATIENT
Start: 2021-09-03 | End: 2021-09-03 | Stop reason: HOSPADM

## 2021-09-03 RX ADMIN — TOPICAL ANESTHETIC 1 SPRAY: 200 SPRAY DENTAL; PERIODONTAL at 09:37

## 2021-09-03 RX ADMIN — Medication 133 MG: at 10:08

## 2021-09-03 RX ADMIN — MIDAZOLAM 0.5 MG: 1 INJECTION INTRAMUSCULAR; INTRAVENOUS at 09:59

## 2021-09-03 RX ADMIN — MIDAZOLAM 2 MG: 1 INJECTION INTRAMUSCULAR; INTRAVENOUS at 09:35

## 2021-09-03 RX ADMIN — FENTANYL CITRATE 50 MCG: 50 INJECTION, SOLUTION INTRAMUSCULAR; INTRAVENOUS at 09:37

## 2021-09-03 RX ADMIN — MIDAZOLAM 0.5 MG: 1 INJECTION INTRAMUSCULAR; INTRAVENOUS at 09:41

## 2021-09-03 RX ADMIN — FENTANYL CITRATE 50 MCG: 50 INJECTION, SOLUTION INTRAMUSCULAR; INTRAVENOUS at 09:35

## 2021-09-03 RX ADMIN — MIDAZOLAM 1 MG: 1 INJECTION INTRAMUSCULAR; INTRAVENOUS at 09:39

## 2021-09-03 RX ADMIN — FENTANYL CITRATE 25 MCG: 50 INJECTION, SOLUTION INTRAMUSCULAR; INTRAVENOUS at 10:01

## 2021-09-03 ASSESSMENT — MIFFLIN-ST. JEOR: SCORE: 1664.64

## 2021-09-03 NOTE — LETTER
August 19, 2021      Ezekiel Barber  [address] [address]        Dear Ezekiel,     Please be aware that coverage of these services is subject to the terms and limitations of your health insurance plan.  Call member services at your health plan with any benefit or coverage questions.    Thank you for choosing [facility]. You are scheduled for the following service(s):    Date:  9/03/2021             Procedure:  COLONOSCOPY  Doctor:        Dr. Vera   Arrival Time:  8:15 am *Enter and check in at the Main Hospital Entrance*  Procedure Time:  8:45 am      Location:   [facility]        Endoscopy Department, First Floor         [address] [place], [place] [address]      [phone] or [phone] ([facility]) to reschedule        MIRALAX -GATORADE  PREP  Colonoscopy is the most accurate test to detect colon polyps and colon cancer; and the only test where polyps can be removed. During this procedure, a doctor examines the lining of your large intestine and rectum through a flexible tube.   Transportation  You must arrange for a ride for the day of your procedure with a responsible adult. A taxi , Uber, etc, is not an option unless you are accompanied by a responsible adult. If you fail to arrange transportation with a responsible adult, your procedure will be cancelled and rescheduled.    Purchase the  following supplies at your local pharmacy:  - 2 (two) bisacodyl tablets: each tablet contains 5 mg.  (Dulcolax  laxative NOT Dulcolax  stool softener)   - 1 (one) 8.3 oz bottle of Polyethylene Glycol (PEG) 3350 Powder   (MiraLAX , Smooth LAX , ClearLAX  or equivalent)  - 64 oz Gatorade    Regular Gatorade, Gatorade G2 , Powerade , Powerade Zero  or Pedialyte  is acceptable. Red colored flavors are not allowed; all other colors (yellow, green, orange, purple and blue) are okay. It is also okay to buy two 2.12 oz packets of powdered Gatorade that can be  mixed with water to a total volume of 64 oz of liquid.  - 1 (one) 10 oz bottle of Magnesium Citrate (Red colored flavors are not allowed)  It is also okay for you to use a 0.5 oz package of powdered magnesium citrate (17 g) mixed with 10 oz of water.      PREPARATION FOR COLONOSCOPY    7 days before:    Discontinue fiber supplements and medications containing iron. This includes Metamucil  and Fibercon ; and multivitamins with iron.    3 days before:    Begin a low-fiber diet. A low-fiber diet helps making the cleanout more effective.     Examples of a low-fiber diet include (but are not limited to): white bread, white rice, pasta, crackers, fish, chicken, eggs, ground beef, creamy peanut butter, cooked/steamed/boiled vegetables, canned fruit, bananas, melons, milk, plain yogurt cheese, salad dressing and other condiments.     The following are not allowed on a low-fiber diet: seeds, nuts, popcorn, bran, whole wheat, corn, quinoa, raw fruits and vegetables, berries and dried fruit, beans and lentils.    For additional details on low-fiber diet, please refer to the table on the last page.    2 days before:    Continue the low-fiber diet.     Drink at least 8 glasses of water throughout the day.     Stop eating solid foods at 11:45 pm.    1 day before:    In the morning: begin a clear liquid diet (liquids you can see through).     Examples of a clear liquid diet include: water, clear broth or bouillon, Gatorade, Pedialyte or Powerade, carbonated and non-carbonated soft drinks (Sprite , 7-Up , ginger ale), strained fruit juices without pulp (apple, white grape, white cranberry), Jell-O  and popsicles.     The following are not allowed on a clear liquid diet: red liquids, alcoholic beverages, dairy products (milk, creamer, and yogurt), protein shakes, creamy broths, juice with pulp and chewing tobacco.    At noon: take 2 (two) bisacodyl tablets     At 4 (and no later than 6pm): start drinking the Miralax-Gatorade  preparation (8.3 oz of Miralax mixed with 64 oz of Gatorade in a large pitcher). Drink 1(one) 8 oz glass every 15 minutes thereafter, until the mixture is gone.    COLON CLEANSING TIPS: drink adequate amounts of fluids before and after your colon cleansing to prevent dehydration. Stay near a toilet because you will have diarrhea. Even if you are sitting on the toilet, continue to drink the cleansing solution every 15 minutes. If you feel nauseous or vomit, rinse your mouth with water, take a 15 to 30-minute-break and then continue drinking the solution. You will be uncomfortable until the stool has flushed from your colon (in about 2 to 4 hours). You may feel chilled.    Day of your procedure  You may take all of your morning medications including blood pressure medications, blood thinners (if you have not been instructed to stop these by our office), methadone, anti-seizure medications with sips of water 3 hours prior to your procedure or earlier. Do not take insulin or vitamins prior to your procedure. Continue the clear liquid diet.   4 hours prior: drink 10 oz of magnesium citrate. It may be easier to drink it with a straw.    STOP consuming all liquids after that.     Do not take anything by mouth during this time.     Allow extra time to travel to your procedure as you may need to stop and use a restroom along the way.    You are ready for the procedure, if you followed all instructions and your stool is no longer formed, but clear or yellow liquid. If you are unsure whether your colon is clean, please call our office at [phone] before you leave for your appointment.    Bring the following to your procedure:  - Insurance Card/Photo ID.   - List of current medications including over-the-counter medications and supplements.   - Your rescue inhaler if you currently use one to control asthma.    Canceling or rescheduling your appointment:   If you must cancel or reschedule your appointment, please call  [phone] as soon as possible.      COLONOSCOPY PRE-PROCEDURE CHECKLIST    If you have diabetes, ask your regular doctor for diet and medication restrictions.  If you take an anticoagulant or anti-platelet medication (such as Coumadin , Lovenox , Pradaxa , Xarelto , Eliquis , etc.), please call your primary doctor for advice on holding this medication.  If you take aspirin you may continue to do so.  If you are or may be pregnant, please discuss the risks and benefits of this procedure with your doctor.        What happens during a colonoscopy?    Plan to spend up to two hours, starting at registration time, at the endoscopy center the day of your procedure. The colonoscopy takes an average of 15 to 30 minutes. Recovery time is about 30 minutes.      Before the exam:    You will change into a gown.    Your medical history and medication list will be reviewed with you, unless that has been done over the phone prior to the procedure.     A nurse will insert an intravenous (IV) line into your hand or arm.    The doctor will meet with you and will give you a consent form to sign.  During the exam:     Medicine will be given through the IV line to help you relax.     Your heart rate and oxygen levels will be monitored. If your blood pressure is low, you may be given fluids through the IV line.     The doctor will insert a flexible hollow tube, called a colonoscope, into your rectum. The scope will be advanced slowly through the large intestine (colon).    You may have a feeling of fullness or pressure.     If an abnormal tissue or a polyp is found, the doctor may remove it through the endoscope for closer examination, or biopsy. Tissue removal is painless    After the exam:           Any tissue samples removed during the exam will be sent to a lab for evaluation. It may take 5-7 working days for you to be notified of the results.     A nurse will provide you with complete discharge instructions before you leave the  endoscopy center. Be sure to ask the nurse for specific instructions if you take blood thinners such as Aspirin, Coumadin or Plavix.     The doctor will prepare a full report for you and for the physician who referred you for the procedure.     Your doctor will talk with you about the initial results of your exam.      Medication given during the exam will prohibit you from driving for the rest of the day.     Following the exam, you may resume your normal diet. Your first meal should be light, no greasy foods. Avoid alcohol until the next day.     You may resume your regular activities the day after the procedure.         LOW-FIBER DIET    Foods RECOMMENDED Foods to AVOID   Breads, Cereal, Rice and Pasta:   White bread, rolls, biscuits, croissant and asia toast.   Waffles, English toast and pancakes.   White rice, noodles, pasta, macaroni and peeled cooked potatoes.   Plain crackers and saltines.   Cooked cereals: farina, cream of rice.   Cold cereals: Puffed Rice , Rice Krispies , Corn Flakes  and Special K    Breads, Cereal, Rice and Pasta:   Breads or rolls with nuts, seeds or fruit.   Whole wheat, pumpernickel, rye breads and cornbread.   Potatoes with skin, brown or wild rice, and kasha (buckwheat).     Vegetables:   Tender cooked and canned vegetables without seeds: carrots, asparagus tips, green or wax beans, pumpkin, spinach, lima beans. Vegetables:   Raw or steamed vegetables.   Vegetables with seeds.   Sauerkraut.   Winter squash, peas, broccoli, Brussel sprouts, cabbage, onions, cauliflower, baked beans, peas and corn.   Fruits:   Strained fruit juice.   Canned fruit, except pineapple.   Ripe bananas and melon. Fruits:   Prunes and prune juice.   Raw fruits.   Dried fruits: figs, dates and raisins.   Milk/Dairy:   Milk: plain or flavored.   Yogurt, custard and ice cream.   Cheese and cottage cheese Milk/Dairy:     Meat and other proteins:   ground, well-cooked tender beef, lamb, ham, veal, pork, fish,  poultry and organ meats.   Eggs.   Peanut butter without nuts. Meat and other proteins:   Tough, fibrous meats with gristle.   Dry beans, peas and lentils.   Peanut butter with nuts.   Tofu.   Fats, Snack, Sweets, Condiments and Beverages:   Margarine, butter, oils, mayonnaise, sour cream and salad dressing, plain gravy.   Sugar, hard candy, clear jelly, honey and syrup.   Spices, cooked herbs, bouillon, broth and soups made with allowed vegetable, ketchup and mustard.   Coffee, tea and carbonated drinks.   Plain cakes, cookies and pretzels.   Gelatin, plain puddings, custard, ice cream, sherbet and popsicles. Fats, Snack, Sweets, Condiments and Beverages:   Nuts, seeds and coconut.   Jam, marmalade and preserves.   Pickles, olives, relish and horseradish.   All desserts containing nuts, seeds, dried fruit and coconut; or made from whole grains or bran.   Candy made with nuts or seeds.   Popcorn.     DIRECTIONS TO THE ENDOSCOPY DEPARTMENT    From the north ([facility])  Take [address], exit on [address] [address]. Get into the left hand [place], turn left (east), go one-half mile to [address] and turn left. Go north to the second stoplight, take a right on [address] and follow it to the Main Hospital entrance.    From the south ([facility])  Take [address]N to the [address] split and exit on [address] [address]. On [address] [address], turn left (west) to [address]. Turn right (north) on [address]. Go north to the second stoplight, take a right on [address] and follow it to the Main Hospital entrance.    From the east via 35E ([facility])  Take [address] south to [address] [address] exit. Turn right on [address] [address]. Go west to [address]. Turn right (north) on [address]. Go to the second stoplight, take a right on [address] to the Main Hospital entrance.    From the east via [address] ([facility])  Take [address] to [address]. Turn left  (south) on [address] to [address], turn left (east) on [address] and follow it to the Main Hospital entrance.    From the west via [address] ([place], [place])  Take [address] east to [address]. Turn right (south) on [address] to [address], turn left (east) on [address] and follow it to the Main Hospital entrance.

## 2021-09-03 NOTE — DISCHARGE INSTRUCTIONS
The patient has received a copy of the Provation  report the doctor has written and discharge instructions have been discussed with the patient and responsible adult.  All questions were addressed and answered prior to patient discharge.      Understanding Diverticulosis and Diverticulitis     Pouches or diverticula usually occur in the lower part of the colon called the sigmoid.      Diverticulitis occurs when the pouches become inflamed.     The colon (large intestine) is the last part of the digestive tract. It absorbs water from stool and changes it from a liquid to a solid. In certain cases, small pouches called diverticula can form in the colon wall. This condition is called diverticulosis. The pouches can become infected. If this happens, it becomes a more serious problem called diverticulitis. These problems can be painful. But they can be managed.   Managing Your Condition  Diet changes or taking medications are often tried first. These may be enough to bring relief. If the case is bad, surgery may be done. You and your doctor can discuss the plan that is best for you.  If You Have Diverticulosis  Diet changes are often enough to control symptoms. The main changes are adding fiber (roughage) and drinking more water. Fiber absorbs water as it travels through your colon. This helps your stool stay soft and move smoothly. Water helps this process. If needed, you may be told to take over-the-counter stool softeners. To help relieve pain, antispasmodic medications may be prescribed.  If You Have Diverticulitis  Treatment depends on how bad your symptoms are.  For mild symptoms: You may be put on a liquid diet for a short time. You may also be prescribed antibiotics. If these two steps relieve your symptoms, you may then be prescribed a high-fiber diet. If you still have symptoms, your doctor will discuss further treatment options with you.  For severe symptoms: You may need to be admitted to the hospital. There,  you can be given IV antibiotics and fluids. Once symptoms are under control, the above treatments may be tried. If these don t control your condition, your doctor may discuss the option of having surgery with you.  Darling to Colon Health  Help keep your colon healthy with a diet that includes plenty of high-fiber fruits, vegetables, and whole grains. Drink plenty of liquids like water and juice. Your doctor may also recommend avoiding seeds and nuts.          [id number] [facility], [address] [place] [address], [place] [address]. All rights reserved. This information is not intended as a substitute for professional medical care. Always follow your healthcare professional's instructions.      Eating a High-Fiber Diet  Fiber is what gives strength and structure to plants. Most grains, beans, vegetables, and fruits contain fiber. Foods rich in fiber are often low in calories and fat, and they fill you up more. They may also reduce your risks for certain health problems. To find out the amount of fiber in canned, packaged, or frozen foods, read the  Nutrition Facts  label. It tells you how much fiber is in a serving.      Types of Fiber and Their Benefits  There are two types of fiber: insoluble and soluble. They both aid digestion and help you maintain a healthy weight.  Insoluble fiber: This is found in whole grains, cereals, certain fruits and vegetables (such as apple skin, corn, and carrots). Insoluble fiber may prevent constipation and reduce the risk of certain types of cancer.   Soluble fiber: This type of fiber is in oats, beans, and certain fruits and vegetables (such as strawberries and peas). Soluble fiber can reduce cholesterol (which may help lower the risk of heart disease), and helps control blood sugar levels.  Look for High-Fiber Foods  Whole-grain breads and cereals: Try to eat 6-8 ounces a day. Include wheat and oat bran cereals, whole-wheat muffins or toast, and corn tortillas in your meals.  Fruits:  "Try to eat 2 cups a day. Apples, oranges, strawberries, pears, and bananas are good sources. (Note: Fruit juice is low in fiber.)  Vegetables: Try to eat 3 cups a day. Add asparagus, carrots, broccoli, peas, and corn to your meals.  Legumes (beans): One cup of cooked lentils gives you over 15 grams of fiber. Try navy beans, lentils, and chickpeas.  Seeds:  A small handful of seeds gives you about 3 grams of fiber. Try sunflower seeds.    Keep Track of Your Fiber  A healthy diet includes 31 grams of fiber a day if you have a 2,000-calorie diet. Keep track of how much fiber you eat. Start by reading food labels. Then eat a variety of foods high in fiber. Ask your doctor about supplemental fiber products.            [id number] [address] [address]. All rights reserved. This information is not intended as a substitute for professional medical care. Always follow your healthcare professional's instructions.        Tips to Control Acid Reflux  To control acid reflux, you ll need to make some basic diet and lifestyle changes. The simple steps outlined below may be all you ll need to relieve discomfort.   Watch What You Eat      Avoid fatty foods and spicy foods.    Eat fewer acidic foods, such as citrus and tomato-based foods. These can increase symptoms.     Limit drinking alcohol, caffeine, and fizzy beverages. All increase acid reflux.    Try limiting chocolate, peppermint, and spearmint. These can worsen acid reflux in some people.    Watch When You Eat    Avoid lying down for 3 hours after eating.    Do not snack before going to bed.    Raise Your Head  Raising your head and upper body by 4\" to 6\" helps limit reflux when you re lying down. Put blocks under the head of the bed frame to raise it.                    8973-3366 [facility], [address] [place] [address], [place] [address]. All rights reserved. This information is not intended as a substitute for professional medical care. " Always follow your healthcare professional's instructions.    Examples of Bed Risers

## 2021-09-03 NOTE — LETTER
August 19, 2021      Ezekiel Barber  [address] [facility] [address]        Dear Ezekiel,     Thank you for choosing [facility]. You are scheduled for the following service(s).   Please be aware that coverage of these services is subject to the terms and limitations of your health insurance plan.  Call member services at your health plan with any benefit or coverage questions.    Date:   9/03/2021 Friday       Procedure: UPPER ENDOSCOPY & COLONOSCOPY  Doctor:  Dr. Vera          Arrival Time:   8:15 am *Enter and check in at the Main Hospital Entrance  Procedure Time:   8:45 am  Location:   [facility]        Endoscopy Department, First Floor (Enter throug the Main Doors) *         [address] [place], [place] [address]      [phone] or [phone] () to reschedule       Upper Endoscopy or Esophagogastroduodenoscopy (EGD) is a test performed to evaluate symptoms of persistent abdominal pain, nausea, vomiting or difficulty swallowing. It may also be used to treat various conditions of the upper gastrointestinal (GI) tract, such as bleeding, narrowing or abnormal growths. During the procedure, a doctor examines the lining of your esophagus, stomach and the first part of your small intestine through a thin, flexible tube called an endoscope. If growths or other abnormalities are found during the procedure, the doctor may remove the abnormal tissue (biopsy) for further examination.     Colonoscopy is the most accurate test to detect colon polyps and colon cancer; and the only test where polyps can be removed. During this procedure, a doctor examines the lining of your large intestine and rectum through a flexible tube.     Transportation  You must arrange for a ride for the day of your procedure with a responsible adult. A taxi , Uber, etc, is not an option unless you are accompanied by a responsible adult. If you fail to arrange transportation with a  responsible adult, your procedure will be cancelled and rescheduled.    What happens during an upper endoscopy?  On the day of your procedure, plan to spend up to one and a half hours after your arrival at the endoscopy center. The exam itself takes about 5 to 10 minutes.  Before the exam:  - You will change into a gown.   - Your medical history and medication list will be reviewed with you, unless it has already been done over the phone.   - A nurse will insert an intravenous (IV) line into your hand or arm.  - The doctor will talk to you and give you a consent form to sign.    During the exam:  - Medicine will be given through the IV line to help you relax and feel comfortable.   - Your heart rate and oxygen levels will be monitored. If your blood pressure is low, you may be given fluids through the IV line.   - The doctor will insert a flexible, hollow tube, called an endoscope, into your mouth and will advance it slowly through the esophagus, stomach and duodenum (the first part of your small intestine).   - You may have a feeling of pressure or fullness.   - If you have difficulty swallowing, and the doctor finds a narrowing in your esophagus, it may be possible for the area to be expanded-dilated during the exam.   - If abnormal tissue is found, the doctor may remove it through the endoscope (biopsy it) for closer examination. The tissue removal is painless.    After the exam:  - Any tissue samples removed during the exam will be sent to a lab for evaluation. It may take 5 to 7 working days for you to be notified of the results  - The doctor will prepare a full report for the physician who referred you for the upper endoscopy.   - The doctor will talk with you about the initial results of your exam.   - You may feel bloated after the procedure. That is normal and should not last long.   - Your throat may feel sore for a short time.   - Following the exam, you may resume your normal diet. Avoid alcohol until  the next day.   - You may resume your regular activities the day after the procedure.   - Medication given during the exam will prohibit you from driving for the rest of the day.  - A nurse will provide you with complete discharge instructions before you leave the endoscopy center. Be sure to ask the nurse for specific instructions if you take blood thinners such as Aspirin , Coumadin , Lovenox , Plavix , etc.       PREPARATION FOR THE UPPER ENDOSCOPY  To ensure a successful exam, please follow all instructions carefully.      The night before your exam:    STOP eating solid foods at 11:45 pm.     Clear liquids are okay to drink (examples: Gatorade , apple juice, clear broth,coffee or tea without milk or cream, etc.).     DO NOT drink red liquids or alcoholic beverages.    The day of your exam:    STOP drinking clear liquids 4 hours before your exam.     You may take your usual medications with 4 oz. of water, but it needs to be at least 4 hours prior to your procedure.    When you leave for the procedure:    Bring a list of all of your current medications, including any allergy or over-the-counter medications, unless you have already reviewed that with an Endoscopy RN over the phone.     Bring a photo ID as well as up-to-date insurance information, such as your insurance card and any referral forms that might be required by your payer.           COLONOSCOPY:  MIRALAX -GATORADE  PREP  Purchase the  following supplies at your local pharmacy:  - 2 (two) bisacodyl tablets: each tablet contains 5 mg.  (Dulcolax  laxative NOT Dulcolax  stool softener)   - 1 (one) 8.3 oz bottle of Polyethylene Glycol (PEG) 3350 Powder   (MiraLAX , Smooth LAX , ClearLAX  or equivalent)  - 64 oz Gatorade    Regular Gatorade, Gatorade G2 , Powerade , Powerade Zero  or Pedialyte  is acceptable. Red colored flavors are not allowed; all other colors (yellow, green, orange, purple and blue) are okay. It is also okay to buy two 2.12 oz packets of  powdered Gatorade that can be mixed with water to a total volume of 64 oz of liquid.  - 1 (one) 10 oz bottle of Magnesium Citrate (Red colored flavors are not allowed)  It is also okay for you to use a 0.5 oz package of powdered magnesium citrate (17 g) mixed with 10 oz of water.      PREPARATION FOR COLONOSCOPY  7 days before:    Discontinue fiber supplements and medications containing iron. This includes Metamucil  and Fibercon ; and multivitamins with iron.    3 days before:    Begin a low-fiber diet. A low-fiber diet helps making the cleanout more effective.     Examples of a low-fiber diet include (but are not limited to): white bread, white rice, pasta, crackers, fish, chicken, eggs, ground beef, creamy peanut butter, cooked/steamed/boiled vegetables, canned fruit, bananas, melons, milk, plain yogurt cheese, salad dressing and other condiments.     The following are not allowed on a low-fiber diet: seeds, nuts, popcorn, bran, whole wheat, corn, quinoa, raw fruits and vegetables, berries and dried fruit, beans and lentils.    For additional details on low-fiber diet, please refer to the table on the last page.    2 days before:    Continue the low-fiber diet.     Drink at least 8 glasses of water throughout the day.     Stop eating solid foods at 11:45 pm.    1 day before:    In the morning: begin a clear liquid diet (liquids you can see through).     Examples of a clear liquid diet include: water, clear broth or bouillon, Gatorade, Pedialyte or Powerade, carbonated and non-carbonated soft drinks (Sprite , 7-Up , ginger ale), strained fruit juices without pulp (apple, white grape, white cranberry), Jell-O  and popsicles.     The following are not allowed on a clear liquid diet: red liquids, alcoholic beverages, dairy products (milk, creamer, and yogurt), protein shakes, creamy broths, juice with pulp and chewing tobacco.    At noon: take 2 (two) bisacodyl tablets     At 4 (and no later than 6pm): start drinking  the Miralax-Gatorade preparation (8.3 oz of Miralax mixed with 64 oz of Gatorade in a large pitcher). Drink 1(one) 8 oz glass every 15 minutes thereafter, until the mixture is gone.    COLON CLEANSING TIPS: drink adequate amounts of fluids before and after your colon cleansing to prevent dehydration. Stay near a toilet because you will have diarrhea. Even if you are sitting on the toilet, continue to drink the cleansing solution every 15 minutes. If you feel nauseous or vomit, rinse your mouth with water, take a 15 to 30-minute-break and then continue drinking the solution. You will be uncomfortable until the stool has flushed from your colon (in about 2 to 4 hours). You may feel chilled.    Day of your procedure  You may take all of your morning medications including blood pressure medications, blood thinners (if you have not been instructed to stop these by our office), methadone, anti-seizure medications with sips of water 3 hours prior to your procedure or earlier. Do not take insulin or vitamins prior to your procedure. Continue the clear liquid diet.   4 hours prior: drink 10 oz of magnesium citrate. It may be easier to drink it with a straw.    STOP consuming all liquids after that.     Do not take anything by mouth during this time.     Allow extra time to travel to your procedure as you may need to stop and use a restroom along the way.  You are ready for the procedure, if you followed all instructions and your stool is no longer formed, but clear or yellow liquid. If you are unsure whether your colon is clean, please call our office at [phone] before you leave for your appointment.  Bring the following to your procedure:  - Insurance Card/Photo ID.   - List of current medications including over-the-counter medications and supplements.   - Your rescue inhaler if you currently use one to control asthma.    Canceling or rescheduling your appointment:   If you must cancel or reschedule your appointment,  please call [phone] as soon as possible.      COLONOSCOPY PRE-PROCEDURE CHECKLIST  If you have diabetes, ask your regular doctor for diet and medication restrictions.  If you take an anticoagulant or anti-platelet medication (such as Coumadin , Lovenox , Pradaxa , Xarelto , Eliquis , etc.), please call your primary doctor for advice on holding this medication.  If you take aspirin you may continue to do so.  If you are or may be pregnant, please discuss the risks and benefits of this procedure with your doctor.    What happens during a colonoscopy?    Plan to spend up to two hours, starting at registration time, at the endoscopy center the day of your procedure. The colonoscopy takes an average of 15 to 30 minutes. Recovery time is about 30 minutes.      Before the exam:    You will change into a gown.    Your medical history and medication list will be reviewed with you, unless that has been done over the phone prior to the procedure.     A nurse will insert an intravenous (IV) line into your hand or arm.    The doctor will meet with you and will give you a consent form to sign.  During the exam:     Medicine will be given through the IV line to help you relax.     Your heart rate and oxygen levels will be monitored. If your blood pressure is low, you may be given fluids through the IV line.     The doctor will insert a flexible hollow tube, called a colonoscope, into your rectum. The scope will be advanced slowly through the large intestine (colon).    You may have a feeling of fullness or pressure.     If an abnormal tissue or a polyp is found, the doctor may remove it through the endoscope for closer examination, or biopsy. Tissue removal is painless    After the exam:           Any tissue samples removed during the exam will be sent to a lab for evaluation. It may take 5-7 working days for you to be notified of the results.     A nurse will provide you with complete discharge instructions before you leave  the endoscopy center. Be sure to ask the nurse for specific instructions if you take blood thinners such as Aspirin, Coumadin or Plavix.     The doctor will prepare a full report for you and for the physician who referred you for the procedure.     Your doctor will talk with you about the initial results of your exam.      Medication given during the exam will prohibit you from driving for the rest of the day.     Following the exam, you may resume your normal diet. Your first meal should be light, no greasy foods. Avoid alcohol until the next day.     You may resume your regular activities the day after the procedure.     LOW-FIBER DIET    Foods RECOMMENDED Foods to AVOID   Breads, Cereal, Rice and Pasta:   White bread, rolls, biscuits, croissant and asia toast.   Waffles, Palestinian toast and pancakes.   White rice, noodles, pasta, macaroni and peeled cooked potatoes.   Plain crackers and saltines.   Cooked cereals: farina, cream of rice.   Cold cereals: Puffed Rice , Rice Krispies , Corn Flakes  and Special K    Breads, Cereal, Rice and Pasta:   Breads or rolls with nuts, seeds or fruit.   Whole wheat, pumpernickel, rye breads and cornbread.   Potatoes with skin, brown or wild rice, and kasha (buckwheat).     Vegetables:   Tender cooked and canned vegetables without seeds: carrots, asparagus tips, green or wax beans, pumpkin, spinach, lima beans. Vegetables:   Raw or steamed vegetables (w/ or without seeds)   Sauerkraut.   Winter squash, peas, broccoli, Brussel sprouts, cabbage, onions, cauliflower, baked beans, peas and corn.   Fruits:   Strained fruit juice.   Canned fruit, except pineapple.   Ripe bananas and melon. Fruits:   Prunes and prune juice.   Raw fruits.   Dried fruits: figs, dates and raisins.   Milk/Dairy:   Milk: plain or flavored; yogurt; ice cream.   Custard; cheese and cottage cheese Milk/Dairy:     Meat and other proteins:   Ground, well-cooked tender beef, lamb, ham, veal, pork, fish, poultry,  organ meats and eggs.   Peanut butter without nuts. Meat and other proteins:   Tough, fibrous meats with gristle.   Dry beans and peas; lentils and Tofu   Peanut butter with nuts.   Fats, Snack, Sweets, Condiments and Beverages:   Margarine, butter, oils, mayonnaise, sour cream and salad dressing, plain gravy.   Sugar, hard candy, clear jelly, honey and syrup.   Spices, cooked herbs, bouillon, broth and soups made with allowed vegetable, ketchup and mustard.   Coffee, tea and carbonated drinks.   Plain cakes, cookies and pretzels.   Gelatin, plain puddings, custard, ice cream, sherbet and popsicles. Fats, Snack, Sweets, Condiments and Beverages:   Nuts, seeds and coconut.   Jam, marmalade and preserves.   Pickles, olives, relish and horseradish.   All desserts containing nuts, seeds, dried fruit and coconut; or made from whole grains or bran.   Candy made with nuts or seeds.   Popcorn.         DIRECTIONS TO THE ENDOSCOPY DEPARTMENT    From the north ([facility])  Take [address] South, exit on [address] [address]. Get into the left hand [place], turn left (east), go one-half mile to [address] and turn left. Go north to the second stoplight, take a right on [address] and follow it to the Main Hospital entrance.    From the south ([facility])  Take 35N to the [address]E split and exit on [address] [address]. On [address] [address], turn left (west) to [address]. Turn right (north) on [address]. Go north to the second stoplight, take a right on [address] and follow it to the Main Hospital entrance.    From the east via 35E ([facility])  Take [address]E south to [address] [address] exit. Turn right on [address] [address]. Go west to [address]. Turn right (north) on [address]. Go to the second stoplight, take a right on [address] to the Main Hospital entrance.    From the east via [address] ([facility])  Take [address] to [address]. Turn left (south) on  [address] to [address], turn left (east) on [address] and follow it to the Main Hospital entrance.    From the west via [address] ([place], [place])  Take [address] east to [address]. Turn right (south) on [address] to [address], turn left (east) on [address] and follow it to the Main Hospital entrance.

## 2021-09-03 NOTE — PROCEDURES
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     Lipoma 02/26/2018    jelly bean sized left forearm     NONSPECIFIC MEDICAL HISTORY 1994    fractured mandible and had it wired shut     Peyronie disease 03/2019     Pure hypercholesterolemia 2001     Taking medication for chronic disease 2020    TRUVADA - check every 6 month HIV and BMP and yearly CMP, HIV, GC, RPR, HepBsAg and lipid panel      Past Surgical History:   Procedure Laterality Date     SURGICAL HISTORY OF -   1994    fractured mandible     SURGICAL HISTORY OF -   4/2013    left eye lazer surgery for detached retina         Bleeding tendencies:  No    Relevant Family History:  NONE     Relevant Social History:  NONE      A relevant review of systems was performed and was negative      ALLERGIES/SENSITIVITIES:   Allergies   Allergen Reactions     No Known Drug Allergies      Seasonal Allergies        CURRENT MEDICATIONS:   No current outpatient medications on file.        PRE-SEDATION ASSESSMENT:    Lung Exam:  normal  Heart Exam:  normal  Airway Exam: normal  Previous reaction to anesthesia/sedation:   No  Sedation plan based on assessment: Moderate (conscious) sedation  ASA Classification:  2 - Mild systemic disease      IMPRESSION:  gerd and screening    PLAN:  egd and colonoscopy    Tayler Vera MD  [facility]  Office: [phone]

## 2021-09-19 ENCOUNTER — HEALTH MAINTENANCE LETTER (OUTPATIENT)
Age: 46
End: 2021-09-19

## 2022-01-21 ENCOUNTER — OFFICE VISIT (OUTPATIENT)
Dept: INTERNAL MEDICINE | Facility: CLINIC | Age: 47
End: 2022-01-21
Payer: COMMERCIAL

## 2022-01-21 ENCOUNTER — ANCILLARY PROCEDURE (OUTPATIENT)
Dept: GENERAL RADIOLOGY | Facility: CLINIC | Age: 47
End: 2022-01-21
Attending: INTERNAL MEDICINE
Payer: COMMERCIAL

## 2022-01-21 VITALS
DIASTOLIC BLOOD PRESSURE: 70 MMHG | TEMPERATURE: 97.9 F | OXYGEN SATURATION: 97 % | BODY MASS INDEX: 27.11 KG/M2 | HEART RATE: 85 BPM | RESPIRATION RATE: 16 BRPM | WEIGHT: 183 LBS | HEIGHT: 69 IN | SYSTOLIC BLOOD PRESSURE: 118 MMHG

## 2022-01-21 DIAGNOSIS — M89.8X1 PAIN OF LEFT SCAPULA: Primary | ICD-10-CM

## 2022-01-21 DIAGNOSIS — M89.8X1 PAIN OF LEFT SCAPULA: ICD-10-CM

## 2022-01-21 PROCEDURE — 99213 OFFICE O/P EST LOW 20 MIN: CPT | Performed by: INTERNAL MEDICINE

## 2022-01-21 PROCEDURE — 71046 X-RAY EXAM CHEST 2 VIEWS: CPT | Performed by: RADIOLOGY

## 2022-01-21 ASSESSMENT — MIFFLIN-ST. JEOR: SCORE: 1705.46

## 2022-01-21 NOTE — PROGRESS NOTES
"  ASSESSMENT:   1. Pain of left scapula  Appears to be musculoskeletal strain but with out obvious trauma besides frequent driving,  x-ray of the chest was obtained to rule out any unexpected pulmonary mass/other.  Chest x-ray to my view is normal.  Patient therefore referred on for physical therapy  - XR Chest 2 Views; Future  - [facility] Referral; Future      PLAN:  CXR - done and normal  Referral for physical therapy.  Central schedulers will contact you or you may call [phone]  If symptoms do not resolve with physical therapy, then follow-up with primary provider for possible referral to orthopedics      (Chart documentation was completed, in part, with [employer] voice-recognition software. Even though reviewed, some grammatical, spelling, and word errors may remain.)    Timur Carroll MD  Internal Medicine Department  [facility]      Jasbir Falcon is a 46 year old who presents for the following health issues     HPI     Chief Complaint   Patient presents with     Derm Problem     Patient c/o painful \"spot\" on left scalpula, x 2 months       Normally followed by other clinic  Pain in a localized spot over the left scapular area for a couple months.  No skin changes.  No specific trauma though patient states he works as a [employer]  and notices the symptoms more when he has had prolonged driving with his left arm extended hanging onto the steering well.  No radiation into the left upper extremity.  Denies neck pain.  No extremity weakness.  Denies fevers, chills, cough, shortness of breath.  Denies any known skin changes       Additional ROS:   Constitutional, HEENT, Cardiovascular, Pulmonary, GI and , Neuro, MSK and Psych review of systems/symptoms are otherwise negative or unchanged from previous, except as noted above.      OBJECTIVE:  /70   Pulse 85   Temp 97.9  F (36.6  C) (Temporal)   Resp 16   Ht 1.753 m (5' 9\")   Wt 83 kg (183 lb)   SpO2 97%  " " BMI 27.02 kg/m     Estimated body mass index is 27.02 kg/m  as calculated from the following:    Height as of this encounter: 1.753 m (5' 9\").    Weight as of this encounter: 83 kg (183 lb).     Neck: no adenopathy. Thyroid normal to palpation. No bruits.  No tenderness to range of motion  Pulm: Lungs clear to auscultation   CV: Regular rates and rhythm   MSK: Tenderness to palpation in localized spot on the medial edge of the left scapula.  No mass palpable.  Some tenderness induced with reaching left hand to touch right shoulder.  No tenderness specifically at the left shoulder with full range of motion testing including abduction  Skin: No rash or lesions noted left upper back  Neuro: Normal strength and tone, sensory exam grossly normal              "

## 2022-01-23 NOTE — PATIENT INSTRUCTIONS
CXR - done and normal  Referral for physical therapy.  Central schedulers will contact you or you may call [phone]  If symptoms do not resolve with physical therapy, then follow-up with primary provider for possible referral to orthopedics

## 2022-06-16 DIAGNOSIS — E78.5 HYPERLIPIDEMIA LDL GOAL <130: ICD-10-CM

## 2022-06-16 DIAGNOSIS — K21.9 GASTROESOPHAGEAL REFLUX DISEASE WITHOUT ESOPHAGITIS: ICD-10-CM

## 2022-06-16 DIAGNOSIS — G25.81 RESTLESS LEGS SYNDROME (RLS): ICD-10-CM

## 2022-06-16 RX ORDER — SIMVASTATIN 20 MG
20 TABLET ORAL AT BEDTIME
Qty: 90 TABLET | Refills: 0 | Status: SHIPPED | OUTPATIENT
Start: 2022-06-16 | End: 2022-07-20

## 2022-06-16 RX ORDER — ROPINIROLE 0.5 MG/1
TABLET, FILM COATED ORAL
Qty: 180 TABLET | Refills: 0 | Status: SHIPPED | OUTPATIENT
Start: 2022-06-16 | End: 2022-07-20

## 2022-06-16 NOTE — TELEPHONE ENCOUNTER
Medication is being filled for 1 time refill only due to:  Patient needs to be seen because due for follow up per protocol.  Routing to MA/TC pool. The Pt is due for a visit with PCP. Please call and help them schedule.    RN will issue a 90 day supply.     Karan MEREDITH RN

## 2022-06-16 NOTE — TELEPHONE ENCOUNTER
Topic: Non-Medical Question.     Hello,  I have a new job with new insurance coverage. Below is the new info:     ID: [id number]  Group: [id number]  Insurer: [employer]  Mail order: [employer]  Mailing address: [address] [place] [address]     Please transfer/refill my prescriptions as I am running low on a few of them. The ones that definitely need to be transferred: omeprazole, simvastatin, and ropinirole.     Let me know if you need anything else!     Ezekiel Barber  [phone]

## 2022-07-04 ENCOUNTER — MYC MEDICAL ADVICE (OUTPATIENT)
Dept: FAMILY MEDICINE | Facility: CLINIC | Age: 47
End: 2022-07-04

## 2022-07-20 ENCOUNTER — OFFICE VISIT (OUTPATIENT)
Dept: FAMILY MEDICINE | Facility: CLINIC | Age: 47
End: 2022-07-20
Payer: COMMERCIAL

## 2022-07-20 VITALS
DIASTOLIC BLOOD PRESSURE: 65 MMHG | HEART RATE: 88 BPM | HEIGHT: 69 IN | TEMPERATURE: 98.2 F | BODY MASS INDEX: 28.29 KG/M2 | RESPIRATION RATE: 20 BRPM | WEIGHT: 191 LBS | OXYGEN SATURATION: 95 % | SYSTOLIC BLOOD PRESSURE: 123 MMHG

## 2022-07-20 DIAGNOSIS — K21.9 GASTROESOPHAGEAL REFLUX DISEASE WITHOUT ESOPHAGITIS: ICD-10-CM

## 2022-07-20 DIAGNOSIS — E78.5 HYPERLIPIDEMIA LDL GOAL <130: ICD-10-CM

## 2022-07-20 DIAGNOSIS — G25.81 RESTLESS LEGS SYNDROME (RLS): ICD-10-CM

## 2022-07-20 DIAGNOSIS — K64.4 EXTERNAL HEMORRHOIDS: Primary | ICD-10-CM

## 2022-07-20 DIAGNOSIS — Z11.3 SCREEN FOR STD (SEXUALLY TRANSMITTED DISEASE): ICD-10-CM

## 2022-07-20 DIAGNOSIS — Z83.3 FAMILY HISTORY OF DIABETES MELLITUS: ICD-10-CM

## 2022-07-20 PROCEDURE — 99214 OFFICE O/P EST MOD 30 MIN: CPT | Performed by: FAMILY MEDICINE

## 2022-07-20 RX ORDER — ROPINIROLE 0.5 MG/1
TABLET, FILM COATED ORAL
Qty: 180 TABLET | Refills: 4 | Status: SHIPPED | OUTPATIENT
Start: 2022-07-20 | End: 2023-07-25

## 2022-07-20 RX ORDER — SIMVASTATIN 20 MG
20 TABLET ORAL AT BEDTIME
Qty: 90 TABLET | Refills: 4 | Status: SHIPPED | OUTPATIENT
Start: 2022-07-20 | End: 2023-07-25

## 2022-07-20 NOTE — PROGRESS NOTES
"  Assessment & Plan     Gastroesophageal reflux disease without esophagitis    - REVIEW OF HEALTH MAINTENANCE PROTOCOL ORDERS  - CBC with Platelets  - omeprazole (PRILOSEC) 20 MG DR capsule  Dispense: 180 capsule; Refill: 0    Hyperlipidemia LDL goal <130  Under control  Refills per epicare    - simvastatin (ZOCOR) 20 MG tablet  Dispense: 90 tablet; Refill: 4  - Comprehensive metabolic panel (BMP + Alb, Alk Phos, ALT, AST, Total. Bili, TP)  - Lipid panel reflex to direct LDL Non-fasting    Restless legs syndrome (RLS)  Recommended low dose iron every other day   - rOPINIRole (REQUIP) 0.5 MG tablet  Dispense: 180 tablet; Refill: 4    External hemorrhoids  Will refer to see about tx - not painful so could be internal but patient feels like it is outside  - Colorectal Surgery Referral    Screen for STD (sexually transmitted disease)    - NEISSERIA GONORRHOEA PCR  - CHLAMYDIA TRACHOMATIS PCR  - HIV Antigen Antibody Combo  - Treponema Abs w Reflex to RPR and Titer    Family history of diabetes mellitus    - Hemoglobin A1c        26 minutes spent on the date of the encounter doing chart review, review of test results, interpretation of tests, patient visit and documentation        BMI:   Estimated body mass index is 28.21 kg/m  as calculated from the following:    Height as of this encounter: 1.753 m (5' 9\").    Weight as of this encounter: 86.6 kg (191 lb).   Weight management plan: Discussed healthy diet and exercise guidelines    See Patient Instructions    No follow-ups on file.    Coty Alegre MD  [facility]    Jasbir Falcon is a 47 year old, presenting for the following health issues:  He is here for yearly med check.   He is doing well.   He has a couple things:   He has been feeling quesy from 3 pm until bed and then wakes okay.    He divided his omeprazole to bid and it helps.   His insurance will not cover  He also has faint tinnitus all the time and mild hearing loss  He also " has hemorrhoids and has for years.  They bleed a lot.   They do NOT hurt.   He does have bleeding with almost every BM.     Recheck Medication and Refill Request      History of Present Illness       Reason for visit:  General physical    He eats 2-3 servings of fruits and vegetables daily.He consumes 0 sweetened beverage(s) daily.He exercises with enough effort to increase his heart rate 10 to 19 minutes per day.  He exercises with enough effort to increase his heart rate 4 days per week.   He is taking medications regularly.       Hyperlipidemia Follow-Up      Are you regularly taking any medication or supplement to lower your cholesterol?   Yes- Zocor    Are you having muscle aches or other side effects that you think could be caused by your cholesterol lowering medication?  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     Lipoma 02/26/2018    jelly bean sized left forearm     NONSPECIFIC MEDICAL HISTORY 1994    fractured mandible and had it wired shut     Peyronie disease 03/2019     Pure hypercholesterolemia 2001     Taking medication for chronic disease 2020    TRUVADA - check every 6 month HIV and BMP and yearly CMP, HIV, GC, RPR, HepBsAg and lipid panel       Past Surgical History:   Procedure Laterality Date     COLONOSCOPY N/A 9/3/2021    Procedure: COLONOSCOPY;  Surgeon: Tayler Vera MD;  Location:  GI     ESOPHAGOSCOPY, GASTROSCOPY, DUODENOSCOPY (EGD), COMBINED N/A 9/3/2021    Procedure: ESOPHAGOGASTRODUODENOSCOPY (EGD);  Surgeon: Tayler Vera MD;  Location:  GI     SURGICAL HISTORY OF -   1994    fractured mandible     SURGICAL HISTORY OF -   4/2013    left eye lazer surgery for detached retina       MEDICATIONS:  Current Outpatient Medications   Medication     omeprazole  "(PRILOSEC) 20 MG DR capsule     rOPINIRole (REQUIP) 0.5 MG tablet     simvastatin (ZOCOR) 20 MG tablet     emtricitabine-tenofovir (TRUVADA) 200-300 MG per tablet     Multiple Vitamins-Minerals (MULTIVITAMIN & MINERAL PO)     No current facility-administered medications for this visit.       SOCIAL HISTORY:  Social History     Tobacco Use     Smoking status: Never Smoker     Smokeless tobacco: Never Used     Tobacco comment: vapes - rarely   Substance Use Topics     Alcohol use: Yes     Comment: RARELY       Family History   Problem Relation Age of Onset     Lipids Mother      Neurologic Disorder Mother         multiple sclerosis     Lipids Father      Genitourinary Problems Father         kidney stone     Thyroid Disease Father         hyperthyroidism     Nephrolithiasis Father      Hypertension Maternal Grandmother      Gastrointestinal Disease Maternal Grandmother         diverticulitis     Hypertension Maternal Grandfather      Diabetes Maternal Grandfather      C.A.D. Paternal Grandfather         in his 60's     Diabetes Paternal Grandfather      Colon Cancer No family hx of          Review of Systems   Constitutional, HEENT, cardiovascular, pulmonary, gi and gu systems are negative, except as otherwise noted.      Objective    /65 (BP Location: Right arm, Patient Position: Sitting, Cuff Size: Adult Large)   Pulse 88   Temp 98.2  F (36.8  C) (Oral)   Resp 20   Ht 1.753 m (5' 9\")   Wt 86.6 kg (191 lb)   SpO2 95%   BMI 28.21 kg/m    Body mass index is 28.21 kg/m .  Physical Exam   GENERAL: healthy, alert and no distress  EYES: Eyes grossly normal to inspection, PERRL and conjunctivae and sclerae normal  HENT: ear canals and TM's normal, nose and mouth without ulcers or lesions  NECK: no adenopathy, no asymmetry, masses, or scars and thyroid normal to palpation  RESP: lungs clear to auscultation - no rales, rhonchi or wheezes  CV: regular rate and rhythm, normal S1 S2, no S3 or S4, no murmur, click or " rub, no peripheral edema and peripheral pulses strong  ABDOMEN: soft, nontender, no hepatosplenomegaly, no masses and bowel sounds normal  MS: no gross musculoskeletal defects noted, no edema  SKIN: no suspicious lesions or rashes  NEURO: Normal strength and tone, mentation intact and speech normal  PSYCH: mentation appears normal, affect normal/bright  LYMPH: no cervical, supraclavicular, axillary, or inguinal adenopathy    Admission on 09/03/2021, Discharged on 09/03/2021   Component Date Value Ref Range Status     Upper GI Endoscopy 09/03/2021    Final                    Value:[facility]  _______________________________________________________________________________  Patient Name: Ezekiel Barber           Procedure Date: 9/3/2021 9:27 AM  MRN: [id number]                       Account Number: [id number]  YOB: 1975               Admit Type: Outpatient  Age: 46                               Gender: Male  Attending MD: Tayler Vera MD    Total Sedation Time: see colonoscopy   report  Instrument Name: 207- Gastroscope       _______________________________________________________________________________     Procedure:                Upper GI endoscopy  Indications:              Heartburn  Providers:                Tayler Vera MD (Doctor)  Referring MD:               Medicines:                Midazolam 3.5 mg IV, Fentanyl 100 micrograms IV,                             Benzocaine spray  Complications:            No immediate complications.  _______________________________________________________________________________                            Procedure:                Pre-Anesthesia Assessment:                            - Prior to the procedure, a History and Physical                             was performed, and patient medications and                             allergies were reviewed. The patient is competent.                             The risks and benefits of  the procedure and the                             sedation options and risks were discussed with the                             patient. All questions were answered and informed                             consent was obtained. Patient identification and                             proposed procedure were verified by the physician                             and the nurse in the procedure room. Mental Status                             Examination: alert and oriented. Airway                             Examination: normal oropharyngeal airway and neck                             mobility. Respiratory Examination: clear to                             auscul                          tation. CV Examination: normal. Prophylactic                             Antibiotics: The patient does not require                             prophylactic antibiotics. Prior Anticoagulants: The                             patient has taken no previous anticoagulant or                             antiplatelet agents. ASA Grade Assessment: II - A                             patient with mild systemic disease. After reviewing                             the risks and benefits, the patient was deemed in                             satisfactory condition to undergo the procedure.                             The anesthesia plan was to use moderate sedation /                             analgesia (conscious sedation). Immediately prior                             to administration of medications, the patient was                             re-assessed for adequacy to receive sedatives. The                             heart rate, respiratory rate, oxygen saturations,                                                       blood pressure, adequacy of pulmonary ventilation,                             and response to care were monitored throughout the                             procedure. The physical status of the patient was                              re-assessed after the procedure.                            After obtaining informed consent, the endoscope was                             passed under direct vision. Throughout the                             procedure, the patient's blood pressure, pulse, and                             oxygen saturations were monitored continuously. The                             Olympus Gastroscope, Model # GIF-H190, Endora #                             207, SN # [id number] was introduced through the mouth,                             and advanced to the third part of duodenum. The                             upper GI endoscopy was accomplished without                             difficulty. The patient tolerated the procedure                             well.                                                                                                             Findings:       The Z-line was regular and was found 40 cm from the incisors.       The examined esophagus was normal.       Patchy mildly erythematous mucosa without bleeding was found in the        gastric antrum. A couple of small, benign, appearing polyps in the        fundus; not biopsied.       A small hiatal hernia was present (z-line and gastric folds 40 cm,        hiatus 42 cm).       The examined duodenum was normal.                                                                                   Impression:               - Z-line regular, 40 cm from the incisors.                            - Normal esophagus.                            - Erythematous mucosa in the antrum.                            - Small hiatal hernia.                            - Normal examined duodenum.                            - No specimens collected.  Recommendation:           Follow up with your PCP.                                                                                                             Procedure Code(s):       --- Professional ---       60442,  Esophagogastroduodenoscopy, flexible, transoral; diagnostic,        including collection of specimen(s) by brushing or washing, when        performed (separate procedure)    CPT copyright 2019 [employer]. All rights reserved.    The codes documented in this report are preliminary and upon  review may   be revised to meet current compliance requirements.    Electronically signed by JOHN Vera M.D.  ___________________  Tayler Vera MD  9/3/2021 9:56:40 AM  I was physically present for the entire viewing portion of the exam.  __________________________Tayler Vera MD  Number of Addenda: 0    Note Initiated On: 9/3/2021 9:27 AM  MRN:                      [id number]  Procedure Date:           9/3/2021 9:27:39 AM  Total Procedure Duration: 0 hours 2 minutes 41 seconds   Estimated Blood Loss:       none  Scope In: 9:45:02 AM  Scope Out: 9:47:43 A                          M       COLONOSCOPY 09/03/2021    Final                    Value:[facility]  _______________________________________________________________________________  Patient Name: Ezekiel Barber           Procedure Date: 9/3/2021 9:27 AM  MRN: [id number]                       Account Number: [id number]  YOB: 1975               Admit Type: Outpatient  Age: 46                               Gender: Male  Attending MD: Tayler Vera MD    Total Sedation Time: __44___ minutes of   continuous bedside 1:1  Instrument Name: 224 - Adult Colonoscope   _______________________________________________________________________________     Procedure:                Colonoscopy  Indications:              Screening for colorectal malignant neoplasm  Providers:                Tayler Vera MD (Doctor)  Referring MD:               Medicines:                Midazolam 0.5 mg IV, Fentanyl 25 micrograms IV  Complications:            No immediate  complications.  _____________________________________________________________________________                          __  Procedure:                Pre-Anesthesia Assessment:                            - Prior to the procedure, a History and Physical                             was performed, and patient medications and                             allergies were reviewed. The patient is competent.                             The risks and benefits of the procedure and the                             sedation options and risks were discussed with the                             patient. All questions were answered and informed                             consent was obtained. Patient identification and                             proposed procedure were verified by the physician                             and the nurse in the procedure room. Mental Status                             Examination: alert and oriented. Airway                             Examination: normal oropharyngeal airway and neck                             mobility. Respiratory Examination: clear to                             ausc                          ultation. CV Examination: normal. Prophylactic                             Antibiotics: The patient does not require                             prophylactic antibiotics. Prior Anticoagulants: The                             patient has taken no previous anticoagulant or                             antiplatelet agents. ASA Grade Assessment: II - A                             patient with mild systemic disease. After reviewing                             the risks and benefits, the patient was deemed in                             satisfactory condition to undergo the procedure.                             The anesthesia plan was to use moderate sedation /                             analgesia (conscious sedation). Immediately prior                             to administration of medications,  the patient was                             re-assessed for adequacy to receive sedatives. The                             heart rate, respiratory rate, oxygen saturations,                                                       blood pressure, adequacy of pulmonary ventilation,                             and response to care were monitored throughout the                             procedure. The physical status of the patient was                             re-assessed after the procedure.                            After obtaining informed consent, the colonoscope                             was passed under direct vision. Throughout the                             procedure, the patient's blood pressure, pulse, and                             oxygen saturations were monitored continuously. The                             Olympus Adult Colonoscope, Model # CF-PH351J,                             Endora # 224, SN # [id number] was introduced through                             the anus and advanced to the cecum, identified by                             appendiceal orifice and ileocecal valve. The                             colonoscopy was performed without difficulty. The                             patie                          nt tolerated the procedure well. The quality                             of the bowel preparation was good.                                                                                   Findings:       A few small-mouthed diverticula were found in the sigmoid colon.       Internal hemorrhoids were found during retroflexion. The hemorrhoids        were medium-sized.       The exam was otherwise without abnormality.                                                                                   Impression:               - Diverticulosis in the sigmoid colon.                            - Internal hemorrhoids.                            - The examination was otherwise normal.                 "            - No specimens collected. Pictures were taken but                             all came up \"black\" when transferred to his chart.  Recommendation:           - Repeat colonoscopy in 10 years for screening                             purposes.                                                                                                               Electronically signed by JOHN Vera M.D.  ___________________  Tayler Vera MD  9/3/2021 10:29:46 AM  I was physically present for the entire viewing portion of the exam.  __________________________Tayler Vera MD  Number of Addenda: 0    Note Initiated On: 9/3/2021 9:27 AM  MRN:                      [id number]  Procedure Date:           9/3/2021 9:27:17 AM  Scope Withdrawal Time: 0 hours 11 minutes 36 seconds   Total Procedure Duration: 0 hours 16 minutes 31 seconds   Estimated Blood Loss:       none  Scope In: 10:02:35 AM  Scope Out: 10:19:06 AM                       .  ..  "

## 2022-08-04 ENCOUNTER — APPOINTMENT (OUTPATIENT)
Dept: LAB | Facility: CLINIC | Age: 47
End: 2022-08-04
Payer: COMMERCIAL

## 2022-08-04 LAB
ALBUMIN SERPL-MCNC: 4.3 G/DL (ref 3.4–5)
ALP SERPL-CCNC: 42 U/L (ref 40–150)
ALT SERPL W P-5'-P-CCNC: 46 U/L (ref 0–70)
ANION GAP SERPL CALCULATED.3IONS-SCNC: 3 MMOL/L (ref 3–14)
AST SERPL W P-5'-P-CCNC: 21 U/L (ref 0–45)
BILIRUB SERPL-MCNC: 0.6 MG/DL (ref 0.2–1.3)
BUN SERPL-MCNC: 16 MG/DL (ref 7–30)
CALCIUM SERPL-MCNC: 9 MG/DL (ref 8.5–10.1)
CHLORIDE BLD-SCNC: 106 MMOL/L (ref 94–109)
CHOLEST SERPL-MCNC: 180 MG/DL
CO2 SERPL-SCNC: 30 MMOL/L (ref 20–32)
CREAT SERPL-MCNC: 1.18 MG/DL (ref 0.66–1.25)
ERYTHROCYTE [DISTWIDTH] IN BLOOD BY AUTOMATED COUNT: 13.1 % (ref 10–15)
GFR SERPL CREATININE-BSD FRML MDRD: 77 ML/MIN/1.73M2
GLUCOSE BLD-MCNC: 96 MG/DL (ref 70–99)
HBA1C MFR BLD: 5.4 % (ref 0–5.6)
HCT VFR BLD AUTO: 42 % (ref 40–53)
HDLC SERPL-MCNC: 45 MG/DL
HGB BLD-MCNC: 14.7 G/DL (ref 13.3–17.7)
HIV 1+2 AB+HIV1 P24 AG SERPL QL IA: NONREACTIVE
LDLC SERPL CALC-MCNC: 100 MG/DL
MCH RBC QN AUTO: 31.3 PG (ref 26.5–33)
MCHC RBC AUTO-ENTMCNC: 35 G/DL (ref 31.5–36.5)
MCV RBC AUTO: 89 FL (ref 78–100)
NONHDLC SERPL-MCNC: 135 MG/DL
PLATELET # BLD AUTO: 236 10E3/UL (ref 150–450)
POTASSIUM BLD-SCNC: 4.5 MMOL/L (ref 3.4–5.3)
PROT SERPL-MCNC: 7.6 G/DL (ref 6.8–8.8)
RBC # BLD AUTO: 4.7 10E6/UL (ref 4.4–5.9)
SODIUM SERPL-SCNC: 139 MMOL/L (ref 133–144)
T PALLIDUM AB SER QL: NONREACTIVE
TRIGL SERPL-MCNC: 175 MG/DL
WBC # BLD AUTO: 7.2 10E3/UL (ref 4–11)

## 2022-08-04 PROCEDURE — 87389 HIV-1 AG W/HIV-1&-2 AB AG IA: CPT | Performed by: FAMILY MEDICINE

## 2022-08-04 PROCEDURE — 86780 TREPONEMA PALLIDUM: CPT | Performed by: FAMILY MEDICINE

## 2022-08-04 PROCEDURE — 36415 COLL VENOUS BLD VENIPUNCTURE: CPT | Performed by: FAMILY MEDICINE

## 2022-08-04 PROCEDURE — 80053 COMPREHEN METABOLIC PANEL: CPT | Performed by: FAMILY MEDICINE

## 2022-08-04 PROCEDURE — 87491 CHLMYD TRACH DNA AMP PROBE: CPT | Performed by: FAMILY MEDICINE

## 2022-08-04 PROCEDURE — 87591 N.GONORRHOEAE DNA AMP PROB: CPT | Performed by: FAMILY MEDICINE

## 2022-08-04 PROCEDURE — 80061 LIPID PANEL: CPT | Performed by: FAMILY MEDICINE

## 2022-08-04 PROCEDURE — 85027 COMPLETE CBC AUTOMATED: CPT | Performed by: FAMILY MEDICINE

## 2022-08-04 PROCEDURE — 83036 HEMOGLOBIN GLYCOSYLATED A1C: CPT | Performed by: FAMILY MEDICINE

## 2022-08-05 LAB
C TRACH DNA SPEC QL NAA+PROBE: NEGATIVE
N GONORRHOEA DNA SPEC QL NAA+PROBE: NEGATIVE

## 2022-10-06 DIAGNOSIS — E78.5 HYPERLIPIDEMIA LDL GOAL <130: ICD-10-CM

## 2022-10-06 DIAGNOSIS — K21.9 GASTROESOPHAGEAL REFLUX DISEASE WITHOUT ESOPHAGITIS: ICD-10-CM

## 2022-10-10 RX ORDER — SIMVASTATIN 20 MG
TABLET ORAL
Qty: 90 TABLET | Refills: 3 | OUTPATIENT
Start: 2022-10-10

## 2022-10-11 ENCOUNTER — MYC MEDICAL ADVICE (OUTPATIENT)
Dept: FAMILY MEDICINE | Facility: CLINIC | Age: 47
End: 2022-10-11

## 2022-11-04 ENCOUNTER — MYC MEDICAL ADVICE (OUTPATIENT)
Dept: FAMILY MEDICINE | Facility: CLINIC | Age: 47
End: 2022-11-04

## 2022-11-20 ENCOUNTER — HEALTH MAINTENANCE LETTER (OUTPATIENT)
Age: 47
End: 2022-11-20

## 2023-07-19 SDOH — HEALTH STABILITY: PHYSICAL HEALTH: ON AVERAGE, HOW MANY DAYS PER WEEK DO YOU ENGAGE IN MODERATE TO STRENUOUS EXERCISE (LIKE A BRISK WALK)?: 0 DAYS

## 2023-07-19 SDOH — ECONOMIC STABILITY: TRANSPORTATION INSECURITY
IN THE PAST 12 MONTHS, HAS THE LACK OF TRANSPORTATION KEPT YOU FROM MEDICAL APPOINTMENTS OR FROM GETTING MEDICATIONS?: NO

## 2023-07-19 SDOH — ECONOMIC STABILITY: INCOME INSECURITY: IN THE LAST 12 MONTHS, WAS THERE A TIME WHEN YOU WERE NOT ABLE TO PAY THE MORTGAGE OR RENT ON TIME?: NO

## 2023-07-19 SDOH — HEALTH STABILITY: PHYSICAL HEALTH: ON AVERAGE, HOW MANY MINUTES DO YOU ENGAGE IN EXERCISE AT THIS LEVEL?: 0 MIN

## 2023-07-19 SDOH — ECONOMIC STABILITY: FOOD INSECURITY: WITHIN THE PAST 12 MONTHS, YOU WORRIED THAT YOUR FOOD WOULD RUN OUT BEFORE YOU GOT MONEY TO BUY MORE.: NEVER TRUE

## 2023-07-19 SDOH — ECONOMIC STABILITY: FOOD INSECURITY: WITHIN THE PAST 12 MONTHS, THE FOOD YOU BOUGHT JUST DIDN'T LAST AND YOU DIDN'T HAVE MONEY TO GET MORE.: NEVER TRUE

## 2023-07-19 SDOH — ECONOMIC STABILITY: TRANSPORTATION INSECURITY
IN THE PAST 12 MONTHS, HAS LACK OF TRANSPORTATION KEPT YOU FROM MEETINGS, WORK, OR FROM GETTING THINGS NEEDED FOR DAILY LIVING?: NO

## 2023-07-19 SDOH — ECONOMIC STABILITY: INCOME INSECURITY: HOW HARD IS IT FOR YOU TO PAY FOR THE VERY BASICS LIKE FOOD, HOUSING, MEDICAL CARE, AND HEATING?: NOT HARD AT ALL

## 2023-07-19 ASSESSMENT — LIFESTYLE VARIABLES
HOW MANY STANDARD DRINKS CONTAINING ALCOHOL DO YOU HAVE ON A TYPICAL DAY: 1 OR 2
AUDIT-C TOTAL SCORE: 4
HOW OFTEN DO YOU HAVE A DRINK CONTAINING ALCOHOL: 2-3 TIMES A WEEK
SKIP TO QUESTIONS 9-10: 0
HOW OFTEN DO YOU HAVE SIX OR MORE DRINKS ON ONE OCCASION: LESS THAN MONTHLY

## 2023-07-19 ASSESSMENT — SOCIAL DETERMINANTS OF HEALTH (SDOH)
HOW OFTEN DO YOU GET TOGETHER WITH FRIENDS OR RELATIVES?: ONCE A WEEK
IN A TYPICAL WEEK, HOW MANY TIMES DO YOU TALK ON THE PHONE WITH FAMILY, FRIENDS, OR NEIGHBORS?: ONCE A WEEK
HOW OFTEN DO YOU ATTEND CHURCH OR RELIGIOUS SERVICES?: NEVER
DO YOU BELONG TO ANY CLUBS OR ORGANIZATIONS SUCH AS CHURCH GROUPS UNIONS, FRATERNAL OR ATHLETIC GROUPS, OR SCHOOL GROUPS?: NO

## 2023-07-19 ASSESSMENT — ENCOUNTER SYMPTOMS
WEAKNESS: 0
HEADACHES: 0
HEARTBURN: 0
JOINT SWELLING: 0
ABDOMINAL PAIN: 0
DYSURIA: 0
ARTHRALGIAS: 1
NAUSEA: 0
HEMATOCHEZIA: 0
SHORTNESS OF BREATH: 0
CHILLS: 0
DIARRHEA: 0
MYALGIAS: 0
EYE PAIN: 0
NERVOUS/ANXIOUS: 0
FEVER: 0
DIZZINESS: 0
HEMATURIA: 0
SORE THROAT: 0
COUGH: 0
PALPITATIONS: 0
FREQUENCY: 0
PARESTHESIAS: 0
CONSTIPATION: 0

## 2023-07-21 DIAGNOSIS — G25.81 RESTLESS LEGS SYNDROME (RLS): ICD-10-CM

## 2023-07-21 DIAGNOSIS — E78.5 HYPERLIPIDEMIA LDL GOAL <130: ICD-10-CM

## 2023-07-25 RX ORDER — SIMVASTATIN 20 MG
TABLET ORAL
Qty: 90 TABLET | Refills: 0 | Status: SHIPPED | OUTPATIENT
Start: 2023-07-25 | End: 2023-07-26

## 2023-07-25 RX ORDER — ROPINIROLE 0.5 MG/1
TABLET, FILM COATED ORAL
Qty: 180 TABLET | Refills: 0 | Status: SHIPPED | OUTPATIENT
Start: 2023-07-25 | End: 2023-07-26

## 2023-07-25 NOTE — TELEPHONE ENCOUNTER
Last refill until visit  Prescription approved per [facility] Refill Protocol.  Daniela Ordoñez RN, BSN  [employer]

## 2023-07-26 ENCOUNTER — ANCILLARY PROCEDURE (OUTPATIENT)
Dept: GENERAL RADIOLOGY | Facility: CLINIC | Age: 48
End: 2023-07-26
Attending: FAMILY MEDICINE
Payer: COMMERCIAL

## 2023-07-26 ENCOUNTER — OFFICE VISIT (OUTPATIENT)
Dept: FAMILY MEDICINE | Facility: CLINIC | Age: 48
End: 2023-07-26
Payer: COMMERCIAL

## 2023-07-26 VITALS
OXYGEN SATURATION: 97 % | HEIGHT: 69 IN | WEIGHT: 177 LBS | TEMPERATURE: 98 F | SYSTOLIC BLOOD PRESSURE: 114 MMHG | HEART RATE: 67 BPM | BODY MASS INDEX: 26.22 KG/M2 | RESPIRATION RATE: 12 BRPM | DIASTOLIC BLOOD PRESSURE: 73 MMHG

## 2023-07-26 DIAGNOSIS — E78.5 HYPERLIPIDEMIA LDL GOAL <130: ICD-10-CM

## 2023-07-26 DIAGNOSIS — Z11.3 SCREEN FOR STD (SEXUALLY TRANSMITTED DISEASE): ICD-10-CM

## 2023-07-26 DIAGNOSIS — M25.561 RIGHT KNEE PAIN, UNSPECIFIED CHRONICITY: ICD-10-CM

## 2023-07-26 DIAGNOSIS — M54.50 LUMBAR PAIN: ICD-10-CM

## 2023-07-26 DIAGNOSIS — Z00.00 ROUTINE HISTORY AND PHYSICAL EXAMINATION OF ADULT: Primary | ICD-10-CM

## 2023-07-26 DIAGNOSIS — K21.9 GASTROESOPHAGEAL REFLUX DISEASE, UNSPECIFIED WHETHER ESOPHAGITIS PRESENT: ICD-10-CM

## 2023-07-26 DIAGNOSIS — G25.81 RESTLESS LEGS SYNDROME (RLS): ICD-10-CM

## 2023-07-26 DIAGNOSIS — Z83.3 FAMILY HISTORY OF DIABETES MELLITUS: ICD-10-CM

## 2023-07-26 LAB
ALBUMIN SERPL BCG-MCNC: 4.6 G/DL (ref 3.5–5.2)
ALP SERPL-CCNC: 43 U/L (ref 40–129)
ALT SERPL W P-5'-P-CCNC: 22 U/L (ref 0–70)
ANION GAP SERPL CALCULATED.3IONS-SCNC: 10 MMOL/L (ref 7–15)
AST SERPL W P-5'-P-CCNC: 24 U/L (ref 0–45)
BILIRUB SERPL-MCNC: 0.5 MG/DL
BUN SERPL-MCNC: 18.6 MG/DL (ref 6–20)
C TRACH DNA SPEC QL NAA+PROBE: NEGATIVE
CALCIUM SERPL-MCNC: 9.7 MG/DL (ref 8.6–10)
CHLORIDE SERPL-SCNC: 106 MMOL/L (ref 98–107)
CHOLEST SERPL-MCNC: 171 MG/DL
CREAT SERPL-MCNC: 1.27 MG/DL (ref 0.67–1.17)
DEPRECATED HCO3 PLAS-SCNC: 27 MMOL/L (ref 22–29)
ERYTHROCYTE [DISTWIDTH] IN BLOOD BY AUTOMATED COUNT: 13 % (ref 10–15)
GFR SERPL CREATININE-BSD FRML MDRD: 70 ML/MIN/1.73M2
GLUCOSE SERPL-MCNC: 98 MG/DL (ref 70–99)
HBA1C MFR BLD: 5.5 % (ref 0–5.6)
HCT VFR BLD AUTO: 41.3 % (ref 40–53)
HDLC SERPL-MCNC: 49 MG/DL
HGB BLD-MCNC: 14.3 G/DL (ref 13.3–17.7)
HIV 1+2 AB+HIV1 P24 AG SERPL QL IA: NONREACTIVE
LDLC SERPL CALC-MCNC: 95 MG/DL
MCH RBC QN AUTO: 30.2 PG (ref 26.5–33)
MCHC RBC AUTO-ENTMCNC: 34.6 G/DL (ref 31.5–36.5)
MCV RBC AUTO: 87 FL (ref 78–100)
N GONORRHOEA DNA SPEC QL NAA+PROBE: NEGATIVE
NONHDLC SERPL-MCNC: 122 MG/DL
PLATELET # BLD AUTO: 223 10E3/UL (ref 150–450)
POTASSIUM SERPL-SCNC: 4.9 MMOL/L (ref 3.4–5.3)
PROT SERPL-MCNC: 7.2 G/DL (ref 6.4–8.3)
RBC # BLD AUTO: 4.73 10E6/UL (ref 4.4–5.9)
SODIUM SERPL-SCNC: 143 MMOL/L (ref 136–145)
T PALLIDUM AB SER QL: NONREACTIVE
TRIGL SERPL-MCNC: 134 MG/DL
WBC # BLD AUTO: 7.3 10E3/UL (ref 4–11)

## 2023-07-26 PROCEDURE — 80061 LIPID PANEL: CPT | Performed by: FAMILY MEDICINE

## 2023-07-26 PROCEDURE — 85027 COMPLETE CBC AUTOMATED: CPT | Performed by: FAMILY MEDICINE

## 2023-07-26 PROCEDURE — 87389 HIV-1 AG W/HIV-1&-2 AB AG IA: CPT | Performed by: FAMILY MEDICINE

## 2023-07-26 PROCEDURE — 86780 TREPONEMA PALLIDUM: CPT | Performed by: FAMILY MEDICINE

## 2023-07-26 PROCEDURE — 83036 HEMOGLOBIN GLYCOSYLATED A1C: CPT | Performed by: FAMILY MEDICINE

## 2023-07-26 PROCEDURE — 36415 COLL VENOUS BLD VENIPUNCTURE: CPT | Performed by: FAMILY MEDICINE

## 2023-07-26 PROCEDURE — 87491 CHLMYD TRACH DNA AMP PROBE: CPT | Performed by: FAMILY MEDICINE

## 2023-07-26 PROCEDURE — 90715 TDAP VACCINE 7 YRS/> IM: CPT | Performed by: FAMILY MEDICINE

## 2023-07-26 PROCEDURE — 99214 OFFICE O/P EST MOD 30 MIN: CPT | Mod: 25 | Performed by: FAMILY MEDICINE

## 2023-07-26 PROCEDURE — 87591 N.GONORRHOEAE DNA AMP PROB: CPT | Performed by: FAMILY MEDICINE

## 2023-07-26 PROCEDURE — 99396 PREV VISIT EST AGE 40-64: CPT | Mod: 25 | Performed by: FAMILY MEDICINE

## 2023-07-26 PROCEDURE — 80053 COMPREHEN METABOLIC PANEL: CPT | Performed by: FAMILY MEDICINE

## 2023-07-26 PROCEDURE — 90471 IMMUNIZATION ADMIN: CPT | Performed by: FAMILY MEDICINE

## 2023-07-26 PROCEDURE — 73562 X-RAY EXAM OF KNEE 3: CPT | Mod: TC | Performed by: RADIOLOGY

## 2023-07-26 RX ORDER — SIMVASTATIN 20 MG
20 TABLET ORAL AT BEDTIME
Qty: 90 TABLET | Refills: 3 | Status: SHIPPED | OUTPATIENT
Start: 2023-07-26 | End: 2024-05-29

## 2023-07-26 RX ORDER — ROPINIROLE 0.5 MG/1
TABLET, FILM COATED ORAL
Qty: 180 TABLET | Refills: 3 | Status: SHIPPED | OUTPATIENT
Start: 2023-07-26 | End: 2024-05-29

## 2023-07-26 ASSESSMENT — ENCOUNTER SYMPTOMS
DYSURIA: 0
NAUSEA: 0
CHILLS: 0
ABDOMINAL PAIN: 0
MYALGIAS: 0
NERVOUS/ANXIOUS: 0
PALPITATIONS: 0
WEAKNESS: 0
FREQUENCY: 0
DIARRHEA: 0
HEARTBURN: 0
COUGH: 0
PARESTHESIAS: 0
HEMATOCHEZIA: 0
HEMATURIA: 0
ARTHRALGIAS: 1
HEADACHES: 0
FEVER: 0
SORE THROAT: 0
CONSTIPATION: 0
DIZZINESS: 0
EYE PAIN: 0
JOINT SWELLING: 0
SHORTNESS OF BREATH: 0

## 2023-07-26 NOTE — PROGRESS NOTES
SUBJECTIVE:   CC: Ezekiel is an 48 year old who presents for preventative health visit. He is on a few chronic meds and has no concerns about these.     He would like a mole looked at on the right side of neck    Also he was walking down hill a few months ago.   He twisted his right knee and it hurt but did not swell.   Now months later he can walk and stand.    It hurts on bottom and in front of knee when sleeping with leg bent.   Tylenol helps.       He feels like he has low back ache and radiation to left back of leg to mid hamstring.    It comes and goes.   He has tried some exercises and it does help a little.   This has been for about a year or less.   It can last up to a week.           No data to display              Healthy Habits:     Getting at least 3 servings of Calcium per day:  Yes    Bi-annual eye exam:  Yes    Dental care twice a year:  NO    Sleep apnea or symptoms of sleep apnea:  None    Diet:  Regular (no restrictions)    Frequency of exercise:  2-3 days/week    Duration of exercise:  15-30 minutes    Taking medications regularly:  Yes    Medication side effects:  None    Additional concerns today:  Yes      Today's PHQ-2 Score:       7/26/2023     7:10 AM   PHQ-2 ( 1999 [employer])   Q1: Little interest or pleasure in doing things 0   Q2: Feeling down, depressed or hopeless 0   PHQ-2 Score 0   Q1: Little interest or pleasure in doing things Not at all   Q2: Feeling down, depressed or hopeless Not at all   PHQ-2 Score 0             Social History     Tobacco Use    Smoking status: Never    Smokeless tobacco: Never    Tobacco comments:     vapes - rarely   Substance Use Topics    Alcohol use: Yes     Comment: RARELY             7/19/2023     9:22 AM   Alcohol Use   Prescreen: >3 drinks/day or >7 drinks/week? No          No data to display                Last PSA: No results found for: PSA    Reviewed orders with patient. Reviewed health maintenance and updated orders accordingly - Yes  Labs reviewed  in EPIC  BP Readings from Last 3 Encounters:   07/26/23 114/73   07/20/22 123/65   01/21/22 118/70    Wt Readings from Last 3 Encounters:   07/26/23 80.3 kg (177 lb)   07/20/22 86.6 kg (191 lb)   01/21/22 83 kg (183 lb)                    Reviewed and updated as needed this visit by clinical staff                  Reviewed and updated as needed this visit by Provider                 Past Medical History:   Diagnosis Date    Chronic GERD 2001    tx with prilosec - will get EGD postcovid    Detached retina, right 2012    back corner - no visual loss - found on eye exam    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    Lipoma 02/26/2018    jelly bean sized left forearm    NONSPECIFIC MEDICAL HISTORY 1994    fractured mandible and had it wired shut    Peyronie disease 03/2019    Pure hypercholesterolemia 2001    Taking medication for chronic disease 2020    TRUVADA - check every 6 month HIV and BMP and yearly CMP, HIV, GC, RPR, HepBsAg and lipid panel      Past Surgical History:   Procedure Laterality Date    BIOPSY  6/22/2015    Mole, right calf    COLONOSCOPY N/A 09/03/2021    rpt in 10 years    ESOPHAGOSCOPY, GASTROSCOPY, DUODENOSCOPY (EGD), COMBINED N/A 09/03/2021    Procedure: ESOPHAGOGASTRODUODENOSCOPY (EGD);  Surgeon: Tayler Vera MD;  Location:  GI    SURGICAL HISTORY OF -   1994    fractured mandible    SURGICAL HISTORY OF -   04/2013    left eye lazer surgery for detached retina       Review of Systems   Constitutional:  Negative for chills and fever.   HENT:  Negative for congestion, ear pain, hearing loss and sore throat.    Eyes:  Negative for pain and visual disturbance.   Respiratory:  Negative for cough and shortness of breath.    Cardiovascular:  Negative for chest pain, palpitations and peripheral edema.   Gastrointestinal:  Negative for abdominal pain, constipation, diarrhea, heartburn, hematochezia and nausea.   Genitourinary:  Negative for dysuria, frequency, genital sores,  "hematuria, impotence, penile discharge and urgency.   Musculoskeletal:  Positive for arthralgias. Negative for joint swelling and myalgias.   Skin:  Negative for rash.   Neurological:  Negative for dizziness, weakness, headaches and paresthesias.   Psychiatric/Behavioral:  Negative for mood changes. The patient is not nervous/anxious.          OBJECTIVE:   /73 (BP Location: Right arm, Patient Position: Chair, Cuff Size: Adult Regular)   Pulse 67   Temp 98  F (36.7  C) (Oral)   Resp 12   Ht 1.753 m (5' 9\")   Wt 80.3 kg (177 lb)   SpO2 97%   BMI 26.14 kg/m      Physical Exam  GENERAL: healthy, alert and no distress  EYES: Eyes grossly normal to inspection, PERRL and conjunctivae and sclerae normal  HENT: ear canals and TM's normal, nose and mouth without ulcers or lesions  NECK: no adenopathy, no asymmetry, masses, or scars and thyroid normal to palpation  RESP: lungs clear to auscultation - no rales, rhonchi or wheezes  CV: regular rate and rhythm, normal S1 S2, no S3 or S4, no murmur, click or rub, no peripheral edema and peripheral pulses strong  ABDOMEN: soft, nontender, no hepatosplenomegaly, no masses and bowel sounds normal  MS: no gross musculoskeletal defects noted, no edema  Knee exam:  The knees were without swelling or redness or effusion.  There was full range of motion and no crepitus with flexion and extension.  Anterior drawer sign was ? On the right  Deni's was negative.    There was a negative patellar apprehension test.  There was no medial joint line tenderness and no lateral joint line tenderness.   SKIN: no suspicious lesions or rashes.   See picture of mole right neck - solar lentigo  NEURO: Normal strength and tone, mentation intact and speech normal  PSYCH: mentation appears normal, affect normal/bright  LYMPH: no cervical, supraclavicular, axillary, or inguinal adenopathy    Diagnostic Test Results:  Labs reviewed in Epic    ASSESSMENT/PLAN:   (Z00.00) Routine history and " physical examination of adult  (primary encounter diagnosis)  Comment:   Plan: tdap    (Z83.3) Family history of diabetes mellitus  Comment:   Plan: A1c    (E78.5) Hyperlipidemia LDL goal <130  Comment: continue current meds  Plan: Refills per epicare   See epicare orders for labs     (K21.9) Gastroesophageal reflux disease, unspecified whether esophagitis present  Comment: stable  Plan: continue omeprazole  Refills per epicare     (Z11.3) Screen for STD (sexually transmitted disease)  Comment: See epicare orders for labs    Right knee pain  Wonder about PFS vs ligament /sprain or ACL  Will get xray today    Low back pain with left leg symptoms   Could be piriformis syndrome or sciatica from low back  Refer to sports medicine    Mole on neck  Appears benign  See media for picture  Monitor for changes       Patient has been advised of split billing requirements and indicates understanding: Yes      COUNSELING:   Reviewed preventive health counseling, as reflected in patient instructions  Special attention given to:        Regular exercise       Healthy diet/nutrition       Syphilis screening for high risk patients        HIV screeninx in teen years, 1x in adult years, and at intervals if high risk       PrEP retrovirus therapy for HIV prevention         He reports that he has never smoked. He has never used smokeless tobacco.            Coty Alegre MD  [facility]

## 2023-07-31 ENCOUNTER — MYC MEDICAL ADVICE (OUTPATIENT)
Dept: FAMILY MEDICINE | Facility: CLINIC | Age: 48
End: 2023-07-31
Payer: COMMERCIAL

## 2023-08-01 ENCOUNTER — TELEPHONE (OUTPATIENT)
Dept: FAMILY MEDICINE | Facility: CLINIC | Age: 48
End: 2023-08-01
Payer: COMMERCIAL

## 2023-08-01 NOTE — TELEPHONE ENCOUNTER
August 1, 2023  Me     KF    8/1/23 11:06 AM  Incomplete Note  PA encounter sent to PA team.  Jojo Epstein, JOY Barber   to P Cr Triage (supporting Coty Alegre MD)         8/1/23 10:37 AM  Larry Uribe,  This situation has been ongoing since I started with my current job. [employer] does *not* cover omeprazole without a pre-authorization with documentation that I've been taking it for the past 21-margarito years and that it's required for my general overall quality of life (they seem to think otherwise). So if an order for omeprazole is sent to [facility] it will be summarily rejected because I'm over the age of 13...which is when they stop covering it. I'm quite certain their on-staff doctor has my health in her or his best interest. :-/ My regular prescription is for 2x20mg and prefer to stay that way since I take 1 at night and 1 in the morning as 2 at night (or at once in general) no longer keeps the stabbing pain away. Hopefully that helps?     Ezekiel  Me   to Ezekiel LEZAMA      8/1/23 10:11 AM  Larry Falcon,     We have not received anything from your pharmacy that omeprazole needs a prior authorization.  Do you take 2 every day?  Often insurance will only pay for one a day.  If you take 2 every day a RX could be sent for 40 mg caps to see if your insurance will cover that.  Please update.     Thanks,     Jojo Epstein RN    Last read by Ezekiel Barber at 10:33 AM on 8/1/2023.  July 31, 2023  Ezekiel Barber   to P Cr Triage (supporting Coty Alegre MD)         7/31/23  7:48 PM  Hel!  I just got off the phone with [employer] and according to the representative, if a pre-authorization is submitted for my Omeprazole prescription they may be willing to fill it. I appreciate your help on this ridiculousness from [employer]!     Ezekiel

## 2023-08-03 NOTE — TELEPHONE ENCOUNTER
PRIOR AUTHORIZATION DENIED    Medication: Omeprazole    Denial Date: 8/3/2023    Denial Rational:  Coverage is provided in situations where the patient is less then or equal to 12 years of age. Review and appeal are not available because of this exclusion.                Appeal Information:  N/A

## 2023-08-14 NOTE — TELEPHONE ENCOUNTER
Called pt and relayed provider message below. Patient was given an opportunity to ask questions, verbalized understanding of plan, and is agreeable.    JOY Staples, Coty THACKER MD  Cr [id number] minutes ago (1:02 PM)     FLASH  Can you let him know the PA was denied.

## 2023-10-23 NOTE — TELEPHONE ENCOUNTER
RECORDS RECEIVED FROM: Referred by Coty Alegre MD   REASON FOR VISIT: lumbar pain   Date of Appt: 11/03/23   NOTES (FOR ALL VISITS) STATUS DETAILS   OFFICE NOTE from referring provider Internal Referral and records in chart   OFFICE NOTE from other specialist N/A    DISCHARGE SUMMARY from hospital N/A    DISCHARGE REPORT from the ER N/A    OPERATIVE REPORT N/A    LUIS F Virus Labs (MS ONLY) N/A    EMG N/A    EEG N/A    MEDICATION LIST N/A    IMAGING  (FOR ALL VISITS)     LUMBAR PUNCTURE N/A    MARILYN SCAN (MOVEMENT) N/A    ULTRASOUND (CAROTID BILAT) *VASCULAR* N/A    MRI (HEAD, NECK, SPINE) N/A    CT (HEAD, NECK, SPINE) N/A

## 2023-11-03 ENCOUNTER — PRE VISIT (OUTPATIENT)
Dept: NEUROSURGERY | Facility: CLINIC | Age: 48
End: 2023-11-03

## 2024-02-16 ENCOUNTER — MYC MEDICAL ADVICE (OUTPATIENT)
Dept: FAMILY MEDICINE | Facility: CLINIC | Age: 49
End: 2024-02-16
Payer: COMMERCIAL

## 2024-02-16 DIAGNOSIS — K21.9 GASTROESOPHAGEAL REFLUX DISEASE WITHOUT ESOPHAGITIS: ICD-10-CM

## 2024-03-04 ENCOUNTER — MYC MEDICAL ADVICE (OUTPATIENT)
Dept: FAMILY MEDICINE | Facility: CLINIC | Age: 49
End: 2024-03-04
Payer: COMMERCIAL

## 2024-03-04 DIAGNOSIS — E78.5 HYPERLIPIDEMIA LDL GOAL <130: Primary | ICD-10-CM

## 2024-03-05 RX ORDER — SIMVASTATIN 20 MG
20 TABLET ORAL AT BEDTIME
Qty: 30 TABLET | Refills: 0 | Status: SHIPPED | OUTPATIENT
Start: 2024-03-05 | End: 2024-05-29

## 2024-05-29 ENCOUNTER — OFFICE VISIT (OUTPATIENT)
Dept: FAMILY MEDICINE | Facility: CLINIC | Age: 49
End: 2024-05-29
Payer: COMMERCIAL

## 2024-05-29 VITALS
BODY MASS INDEX: 26.9 KG/M2 | TEMPERATURE: 97.4 F | OXYGEN SATURATION: 94 % | WEIGHT: 181.6 LBS | DIASTOLIC BLOOD PRESSURE: 74 MMHG | HEART RATE: 84 BPM | SYSTOLIC BLOOD PRESSURE: 133 MMHG | HEIGHT: 69 IN | RESPIRATION RATE: 18 BRPM

## 2024-05-29 DIAGNOSIS — Z11.3 SCREEN FOR STD (SEXUALLY TRANSMITTED DISEASE): ICD-10-CM

## 2024-05-29 DIAGNOSIS — Z00.00 ROUTINE GENERAL MEDICAL EXAMINATION AT A HEALTH CARE FACILITY: Primary | ICD-10-CM

## 2024-05-29 DIAGNOSIS — G25.81 RESTLESS LEGS SYNDROME (RLS): ICD-10-CM

## 2024-05-29 DIAGNOSIS — E78.5 HYPERLIPIDEMIA LDL GOAL <130: ICD-10-CM

## 2024-05-29 DIAGNOSIS — J30.1 SEASONAL ALLERGIC RHINITIS DUE TO POLLEN: ICD-10-CM

## 2024-05-29 DIAGNOSIS — K21.9 GASTROESOPHAGEAL REFLUX DISEASE WITHOUT ESOPHAGITIS: ICD-10-CM

## 2024-05-29 DIAGNOSIS — Z83.3 FAMILY HISTORY OF DIABETES MELLITUS: ICD-10-CM

## 2024-05-29 LAB
ALBUMIN SERPL BCG-MCNC: 4.5 G/DL (ref 3.5–5.2)
ALP SERPL-CCNC: 49 U/L (ref 40–150)
ALT SERPL W P-5'-P-CCNC: 36 U/L (ref 0–70)
ANION GAP SERPL CALCULATED.3IONS-SCNC: 11 MMOL/L (ref 7–15)
AST SERPL W P-5'-P-CCNC: 26 U/L (ref 0–45)
BILIRUB SERPL-MCNC: 0.4 MG/DL
BUN SERPL-MCNC: 17.6 MG/DL (ref 6–20)
C TRACH DNA SPEC QL NAA+PROBE: NEGATIVE
CALCIUM SERPL-MCNC: 9.3 MG/DL (ref 8.6–10)
CHLORIDE SERPL-SCNC: 107 MMOL/L (ref 98–107)
CHOLEST SERPL-MCNC: 173 MG/DL
CREAT SERPL-MCNC: 1.09 MG/DL (ref 0.67–1.17)
DEPRECATED HCO3 PLAS-SCNC: 26 MMOL/L (ref 22–29)
EGFRCR SERPLBLD CKD-EPI 2021: 84 ML/MIN/1.73M2
ERYTHROCYTE [DISTWIDTH] IN BLOOD BY AUTOMATED COUNT: 13 % (ref 10–15)
FASTING STATUS PATIENT QL REPORTED: YES
FASTING STATUS PATIENT QL REPORTED: YES
GLUCOSE SERPL-MCNC: 103 MG/DL (ref 70–99)
HBA1C MFR BLD: 5.5 % (ref 0–5.6)
HCT VFR BLD AUTO: 37.5 % (ref 40–53)
HCV AB SERPL QL IA: NONREACTIVE
HDLC SERPL-MCNC: 41 MG/DL
HGB BLD-MCNC: 12.8 G/DL (ref 13.3–17.7)
HIV 1+2 AB+HIV1 P24 AG SERPL QL IA: NONREACTIVE
LDLC SERPL CALC-MCNC: 87 MG/DL
MCH RBC QN AUTO: 30.2 PG (ref 26.5–33)
MCHC RBC AUTO-ENTMCNC: 34.1 G/DL (ref 31.5–36.5)
MCV RBC AUTO: 88 FL (ref 78–100)
N GONORRHOEA DNA SPEC QL NAA+PROBE: NEGATIVE
NONHDLC SERPL-MCNC: 132 MG/DL
PLATELET # BLD AUTO: 283 10E3/UL (ref 150–450)
POTASSIUM SERPL-SCNC: 4.2 MMOL/L (ref 3.4–5.3)
PROT SERPL-MCNC: 7 G/DL (ref 6.4–8.3)
RBC # BLD AUTO: 4.24 10E6/UL (ref 4.4–5.9)
SODIUM SERPL-SCNC: 144 MMOL/L (ref 135–145)
T PALLIDUM AB SER QL: NONREACTIVE
TRIGL SERPL-MCNC: 224 MG/DL
WBC # BLD AUTO: 10.3 10E3/UL (ref 4–11)

## 2024-05-29 PROCEDURE — 86803 HEPATITIS C AB TEST: CPT | Performed by: FAMILY MEDICINE

## 2024-05-29 PROCEDURE — 85027 COMPLETE CBC AUTOMATED: CPT | Performed by: FAMILY MEDICINE

## 2024-05-29 PROCEDURE — 87491 CHLMYD TRACH DNA AMP PROBE: CPT | Performed by: FAMILY MEDICINE

## 2024-05-29 PROCEDURE — 86780 TREPONEMA PALLIDUM: CPT | Performed by: FAMILY MEDICINE

## 2024-05-29 PROCEDURE — 99396 PREV VISIT EST AGE 40-64: CPT | Performed by: FAMILY MEDICINE

## 2024-05-29 PROCEDURE — 80053 COMPREHEN METABOLIC PANEL: CPT | Performed by: FAMILY MEDICINE

## 2024-05-29 PROCEDURE — 87591 N.GONORRHOEAE DNA AMP PROB: CPT | Performed by: FAMILY MEDICINE

## 2024-05-29 PROCEDURE — 87389 HIV-1 AG W/HIV-1&-2 AB AG IA: CPT | Performed by: FAMILY MEDICINE

## 2024-05-29 PROCEDURE — 83036 HEMOGLOBIN GLYCOSYLATED A1C: CPT | Performed by: FAMILY MEDICINE

## 2024-05-29 PROCEDURE — 80061 LIPID PANEL: CPT | Performed by: FAMILY MEDICINE

## 2024-05-29 PROCEDURE — 36415 COLL VENOUS BLD VENIPUNCTURE: CPT | Performed by: FAMILY MEDICINE

## 2024-05-29 PROCEDURE — 99213 OFFICE O/P EST LOW 20 MIN: CPT | Performed by: FAMILY MEDICINE

## 2024-05-29 RX ORDER — FLUTICASONE PROPIONATE 50 MCG
1 SPRAY, SUSPENSION (ML) NASAL DAILY
COMMUNITY
Start: 2024-05-29

## 2024-05-29 RX ORDER — ROPINIROLE 0.5 MG/1
TABLET, FILM COATED ORAL
Qty: 180 TABLET | Refills: 3 | Status: SHIPPED | OUTPATIENT
Start: 2024-05-29 | End: 2024-07-15

## 2024-05-29 RX ORDER — SIMVASTATIN 20 MG
20 TABLET ORAL AT BEDTIME
Qty: 90 TABLET | Refills: 4 | Status: SHIPPED | OUTPATIENT
Start: 2024-05-29

## 2024-05-29 SDOH — HEALTH STABILITY: PHYSICAL HEALTH: ON AVERAGE, HOW MANY DAYS PER WEEK DO YOU ENGAGE IN MODERATE TO STRENUOUS EXERCISE (LIKE A BRISK WALK)?: 3 DAYS

## 2024-05-29 SDOH — HEALTH STABILITY: PHYSICAL HEALTH: ON AVERAGE, HOW MANY MINUTES DO YOU ENGAGE IN EXERCISE AT THIS LEVEL?: 60 MIN

## 2024-05-29 ASSESSMENT — LIFESTYLE VARIABLES
SKIP TO QUESTIONS 9-10: 1
HOW OFTEN DO YOU HAVE A DRINK CONTAINING ALCOHOL: 2-3 TIMES A WEEK
HOW MANY STANDARD DRINKS CONTAINING ALCOHOL DO YOU HAVE ON A TYPICAL DAY: 1 OR 2
AUDIT-C TOTAL SCORE: 3
HOW OFTEN DO YOU HAVE SIX OR MORE DRINKS ON ONE OCCASION: NEVER

## 2024-05-29 ASSESSMENT — SOCIAL DETERMINANTS OF HEALTH (SDOH)
HOW OFTEN DO YOU GET TOGETHER WITH FRIENDS OR RELATIVES?: PATIENT DECLINED
DO YOU BELONG TO ANY CLUBS OR ORGANIZATIONS SUCH AS CHURCH GROUPS UNIONS, FRATERNAL OR ATHLETIC GROUPS, OR SCHOOL GROUPS?: NO
HOW OFTEN DO YOU GET TOGETHER WITH FRIENDS OR RELATIVES?: PATIENT DECLINED
IN A TYPICAL WEEK, HOW MANY TIMES DO YOU TALK ON THE PHONE WITH FAMILY, FRIENDS, OR NEIGHBORS?: PATIENT DECLINED
HOW OFTEN DO YOU ATTENT MEETINGS OF THE CLUB OR ORGANIZATION YOU BELONG TO?: NEVER
HOW OFTEN DO YOU ATTEND CHURCH OR RELIGIOUS SERVICES?: NEVER

## 2024-05-29 NOTE — PATIENT INSTRUCTIONS
"Preventive Care Advice   This is general advice we often give to help people stay healthy. Your care team may have specific advice just for you. Please talk to your care team about your own preventive care needs.  Lifestyle  Exercise at least 150 minutes each week (30 minutes a day, 5 days a week).  Do muscle strengthening activities 2 days a week. These help control your weight and prevent disease.  No smoking.  Wear sunscreen to prevent skin cancer.  Have your home tested for radon every 2 to 5 years. Radon is a colorless, odorless gas that can harm your lungs. To learn more, go to www.health.[facility]. and search for \"Radon in Homes.\"  Keep guns unloaded and locked up in a safe place like a safe or gun vault, or, use a gun lock and hide the keys. Always lock away bullets separately. To learn more, visit [place].gov and search for \"safe gun storage.\"  Nutrition  Eat 5 or more servings of fruits and vegetables each day.  Try wheat bread, brown rice and whole grain pasta (instead of white bread, rice, and pasta).  Get enough calcium and vitamin D. Check the label on foods and aim for 100% of the RDA (recommended daily allowance).  Regular exams  Have a dental exam and cleaning every 6 months.  See your health care team every year to talk about:  Any changes in your health.  Any medicines your care team has prescribed.  Preventive care, family planning, and ways to prevent chronic diseases.  Shots (vaccines)   HPV shots (up to age 26), if you've never had them before.  Hepatitis B shots (up to age 59), if you've never had them before.  COVID-19 shot: Get this shot when it's due.  Flu shot: Get a flu shot every year.  Tetanus shot: Get a tetanus shot every 10 years.  Pneumococcal, hepatitis A, and RSV shots: Ask your care team if you need these based on your risk.  Shingles shot (for age 50 and up).  General health tests  Diabetes screening:  Starting at age 35, Get screened for diabetes at least every 3 years.  If " you are younger than age 35, ask your care team if you should be screened for diabetes.  Cholesterol test: At age 39, start having a cholesterol test every 5 years, or more often if advised.  Bone density scan (DEXA): At age 50, ask your care team if you should have this scan for osteoporosis (brittle bones).  Hepatitis C: Get tested at least once in your life.  Abdominal aortic aneurysm screening: Talk to your doctor about having this screening if you:  Have ever smoked; and  Are biologically male; and  Are between the ages of 65 and 75.  STIs (sexually transmitted infections)  Before age 24: Ask your care team if you should be screened for STIs.  After age 24: Get screened for STIs if you're at risk. You are at risk for STIs (including HIV) if:  You are sexually active with more than one person.  You don't use condoms every time.  You or a partner was diagnosed with a sexually transmitted infection.  If you are at risk for HIV, ask about PrEP medicine to prevent HIV.  Get tested for HIV at least once in your life, whether you are at risk for HIV or not.  Cancer screening tests  Cervical cancer screening: If you have a cervix, begin getting regular cervical cancer screening tests at age 21. Most people who have regular screenings with normal results can stop after age 65. Talk about this with your provider.  Breast cancer scan (mammogram): If you've ever had breasts, begin having regular mammograms starting at age 40. This is a scan to check for breast cancer.  Colon cancer screening: It is important to start screening for colon cancer at age 45.  Have a colonoscopy test every 10 years (or more often if you're at risk) Or, ask your provider about stool tests like a FIT test every year or Cologuard test every 3 years.  To learn more about your testing options, visit: www.[employer]/943009.pdf.  For help making a decision, visit: segun/ad35349.  Prostate cancer screening test: If you have a prostate and are age 55  to 69, ask your provider if you would benefit from a yearly prostate cancer screening test.  Lung cancer screening: If you are a current or former smoker age 50 to 80, ask your care team if ongoing lung cancer screenings are right for you.  For informational purposes only. Not to replace the advice of your health care provider. Copyright   2023 [facility]. All rights reserved. Clinically reviewed by the [facility] Transitions Program. [employer] 582087 - REV 04/24.    Substance Use Disorder: Care Instructions  Overview     You can improve your life and health by stopping your use of alcohol or drugs. When you don't drink or use drugs, you may feel and sleep better. You may get along better with your family, friends, and coworkers. There are medicines and programs that can help with substance use disorder.  How can you care for yourself at home?  Here are some ways to help you stay sober and prevent relapse.  If you have been given medicine to help keep you sober or reduce your cravings, be sure to take it exactly as prescribed.  Talk to your doctor about programs that can help you stop using drugs or drinking alcohol.  Do not keep alcohol or drugs in your home.  Plan ahead. Think about what you'll say if other people ask you to drink or use drugs. Try not to spend time with people who drink or use drugs.  Use the time and money spent on drinking or drugs to do something that's important to you.  Preventing a relapse  Have a plan to deal with relapse. Learn to recognize changes in your thinking that lead you to drink or use drugs. Get help before you start to drink or use drugs again.  Try to stay away from situations, friends, or places that may lead you to drink or use drugs.  If you feel the need to drink alcohol or use drugs again, seek help right away. Call a trusted friend or family member. Some people get support from organizations such as Narcotics Anonymous or [employer] or from  treatment facilities.  If you relapse, get help as soon as you can. Some people make a plan with another person that outlines what they want that person to do for them if they relapse. The plan usually includes how to handle the relapse and who to notify in case of relapse.  Don't give up. Remember that a relapse doesn't mean that you have failed. Use the experience to learn the triggers that lead you to drink or use drugs. Then quit again. Recovery is a lifelong process. Many people have several relapses before they are able to quit for good.  Follow-up care is a key part of your treatment and safety. Be sure to make and go to all appointments, and call your doctor if you are having problems. It's also a good idea to know your test results and keep a list of the medicines you take.  When should you call for help?   Call 911  anytime you think you may need emergency care. For example, call if you or someone else:    Has overdosed or has withdrawal signs. Be sure to tell the emergency workers that you are or someone else is using or trying to quit using drugs. Overdose or withdrawal signs may include:  Losing consciousness.  Seizure.  Seeing or hearing things that aren't there (hallucinations).     Is thinking or talking about suicide or harming others.   Where to get help 24 hours a day, 7 days a week   If you or someone you know talks about suicide, self-harm, a mental health crisis, a substance use crisis, or any other kind of emotional distress, get help right away. You can:    Call the Suicide and Crisis Lifeline at 988.     Call 3-228-955-TALK (1-206.460.1597).     Text HOME to 640763 to access the Crisis Text Line.   Consider saving these numbers in your phone.  Go to [employer] for more information or to chat online.  Call your doctor now or seek immediate medical care if:    You are having withdrawal symptoms. These may include nausea or vomiting, sweating, shakiness, and anxiety.   Watch closely for  "changes in your health, and be sure to contact your doctor if:    You have a relapse.     You need more help or support to stop.   Where can you learn more?  Go to https://www.[employer].net/patiented  Enter H573 in the search box to learn more about \"Substance Use Disorder: Care Instructions.\"  Current as of: November 15, 2023               Content Version: 14.0    3223-1825 [employer].   Care instructions adapted under license by your healthcare professional. If you have questions about a medical condition or this instruction, always ask your healthcare professional. [employer] disclaims any warranty or liability for your use of this information.      "

## 2024-05-29 NOTE — PROGRESS NOTES
"Preventive Care Visit  [facility]  Coty Alegre MD, Family Medicine  May 29, 2024      Assessment & Plan     Routine general medical examination at a health care facility  Discussed diet and exercise    Seasonal allergic rhinitis due to pollen  Recommend flonase  Consider singulair in future if this does not work   - fluticasone (FLONASE) 50 MCG/ACT nasal spray    Gastroesophageal reflux disease without esophagitis  Stable  Refills per epicare     - omeprazole (PRILOSEC) 20 MG DR capsule  Dispense: 180 capsule; Refill: 4    Restless legs syndrome (RLS)  Stable  Refills per epicare    - rOPINIRole (REQUIP) 0.5 MG tablet  Dispense: 180 tablet; Refill: 3    Hyperlipidemia LDL goal <130  Under control  Refills per epicare    - simvastatin (ZOCOR) 20 MG tablet  Dispense: 90 tablet; Refill: 4  - Comprehensive metabolic panel (BMP + Alb, Alk Phos, ALT, AST, Total. Bili, TP)  - Lipid panel reflex to direct LDL Fasting    Screen for STD (sexually transmitted disease)    - CBC with platelets  - HIV Antigen Antibody Combo  - Treponema Abs w Reflex to RPR and Titer  - NEISSERIA GONORRHOEA PCR  - CHLAMYDIA TRACHOMATIS PCR  - Hepatitis C Screen Reflex to HCV RNA Quant and Genotype    Family history of diabetes mellitus    - Hemoglobin A1c      Patient has been advised of split billing requirements and indicates understanding: Yes        BMI  Estimated body mass index is 26.82 kg/m  as calculated from the following:    Height as of this encounter: 1.753 m (5' 9\").    Weight as of this encounter: 82.4 kg (181 lb 9.6 oz).   Weight management plan: Discussed healthy diet and exercise guidelines    Counseling  Appropriate preventive services were discussed with this patient, including applicable screening as appropriate for fall prevention, nutrition, physical activity, Tobacco-use cessation, weight loss and cognition.  Checklist reviewing preventive services available has been given to the " patient.  Reviewed patient's diet, addressing concerns and/or questions.   He is at risk for lack of exercise and has been provided with information to increase physical activity for the benefit of his well-being.   The patient was instructed to see the dentist every 6 months.       FUTURE APPOINTMENTS:       - Follow-up visit in one year  See Patient Instructions    Jasbir Falcon is a 49 year old, presenting for the following:  Physical  He is also here for recheck on meds for RLS and reflux and cholesterol.             5/29/2024     8:01 AM   Additional Questions   Roomed by Kaitlyn Guerrero        Health Care Directive  Patient does not have a Health Care Directive or Living Will: Discussed advance care planning with patient; information given to patient to review.  Discussed in the past   HPI  See above           5/29/2024   General Health   How would you rate your overall physical health? Good   Feel stress (tense, anxious, or unable to sleep) Not at all    Not at all         5/29/2024   Nutrition   Three or more servings of calcium each day? Yes   Diet: Regular (no restrictions)   How many servings of fruit and vegetables per day? (!) 2-3   How many sweetened beverages each day? 0-1         5/29/2024   Exercise   Days per week of moderate/strenous exercise 3 days    3 days   Average minutes spent exercising at this level 60 min         5/29/2024   Social Factors   Frequency of gathering with friends or relatives Patient declined    Patient declined   Worry food won't last until get money to buy more No    No   Food not last or not have enough money for food? No    No   Do you have housing?  Yes    Yes   Are you worried about losing your housing? No    No   Lack of transportation? No    No   Unable to get utilities (heat,electricity)? No    No         5/29/2024   Dental   Dentist two times every year? (!) NO         5/29/2024   TB Screening   Were you born outside of the [place]? No         Today's PHQ-2 Score:        5/29/2024     7:54 AM   PHQ-2 ( 1999 [employer])   Q1: Little interest or pleasure in doing things 0   Q2: Feeling down, depressed or hopeless 0   PHQ-2 Score 0   Q1: Little interest or pleasure in doing things Not at all   Q2: Feeling down, depressed or hopeless Not at all   PHQ-2 Score 0           5/29/2024   Substance Use   Frequency of drinking alcohol? 2-3 times a week   Alcohol more than 3/day or more than 7/wk No   Do you use any other substances recreationally? (!) ALCOHOL    (!) CANNABIS PRODUCTS     Social History     Tobacco Use    Smoking status: Never    Smokeless tobacco: Never    Tobacco comments:     vapes - rarely   Vaping Use    Vaping status: Every Day    Substances: THC    Devices: Disposable   Substance Use Topics    Alcohol use: Yes     Comment: RARELY    Drug use: Yes     Types: Marijuana     Comment: 2-3x week           5/29/2024   STI Screening   New sexual partner(s) since last STI/HIV test? No   ASCVD Risk   The 10-year ASCVD risk score ([place] QUINONES, et al., 2019) is: 2.4%    Values used to calculate the score:      Age: 49 years      Sex: Male      Is Non- : No      Diabetic: No      Tobacco smoker: No      Systolic Blood Pressure: 133 mmHg      Is BP treated: No      HDL Cholesterol: 49 mg/dL      Total Cholesterol: 171 mg/dL        5/29/2024   Contraception/Family Planning   Questions about contraception or family planning No        Reviewed and updated as needed this visit by Provider                    Labs reviewed in EPIC  BP Readings from Last 3 Encounters:   05/29/24 133/74   07/26/23 114/73   07/20/22 123/65    Wt Readings from Last 3 Encounters:   05/29/24 82.4 kg (181 lb 9.6 oz)   07/26/23 80.3 kg (177 lb)   07/20/22 86.6 kg (191 lb)                  Patient Active Problem List   Diagnosis    Spasm of muscle    Esophageal reflux    Family history of diabetes mellitus    Allergic rhinitis    Hyperlipidemia LDL goal <130    Restless legs syndrome  (RLS)    Lipoma of skin and subcutaneous tissue    Dermatitis    Chafing    External hemorrhoids    Thrombosis of superficial vein of penis    Peyronie disease     Past Surgical History:   Procedure Laterality Date    BIOPSY  6/22/2015    Mole, right calf    COLONOSCOPY N/A 09/03/2021    rpt in 10 years    ESOPHAGOSCOPY, GASTROSCOPY, DUODENOSCOPY (EGD), COMBINED N/A 09/03/2021    recheck in 5-10 years (EGD);  Surgeon: Tayler Vera MD;  Location:  GI    SURGICAL HISTORY OF -   1994    fractured mandible    SURGICAL HISTORY OF -   04/2013    left eye lazer surgery for detached retina       Social History     Tobacco Use    Smoking status: Never    Smokeless tobacco: Never    Tobacco comments:     vapes - rarely   Substance Use Topics    Alcohol use: Yes     Comment: RARELY     Family History   Problem Relation Age of Onset    Lipids Mother     Neurologic Disorder Mother         multiple sclerosis    Hyperlipidemia Mother     Lipids Father     Genitourinary Problems Father         kidney stone    Thyroid Disease Father         hyperthyroidism    Nephrolithiasis Father     Hyperlipidemia Father     Hypertension Maternal Grandmother     Gastrointestinal Disease Maternal Grandmother         diverticulitis    Other Cancer Maternal Grandmother 96        blood cancer with kidney failure    Diabetes Type 2  Maternal Grandmother     Hypertension Maternal Grandfather     Diabetes Maternal Grandfather     C.A.D. Paternal Grandfather         in his 60's    Diabetes Paternal Grandfather     Other Cancer Other         Aunt - head and neck cancer    Diabetes Type 2  Other     Colon Cancer No family hx of          Current Outpatient Medications   Medication Sig Dispense Refill    emtricitabine-tenofovir (TRUVADA) 200-300 MG per tablet Take 1 tablet by mouth daily 30 each 5    fluticasone (FLONASE) 50 MCG/ACT nasal spray Spray 1 spray into both nostrils daily      omeprazole (PRILOSEC) 20 MG DR capsule Take 2 capsules (40  "mg) by mouth daily 180 capsule 4    rOPINIRole (REQUIP) 0.5 MG tablet TAKE 2 TABLETS NIGHTLY AS NEEDED 180 tablet 3    simvastatin (ZOCOR) 20 MG tablet Take 1 tablet (20 mg) by mouth at bedtime 90 tablet 4         Review of Systems  Constitutional, HEENT, cardiovascular, pulmonary, gi and gu systems are negative, except as otherwise noted.     Objective    Exam  /74 (BP Location: Left arm, Patient Position: Sitting, Cuff Size: Adult Regular)   Pulse 84   Temp 97.4  F (36.3  C) (Oral)   Resp 18   Ht 1.753 m (5' 9\")   Wt 82.4 kg (181 lb 9.6 oz)   SpO2 94%   BMI 26.82 kg/m     Estimated body mass index is 26.82 kg/m  as calculated from the following:    Height as of this encounter: 1.753 m (5' 9\").    Weight as of this encounter: 82.4 kg (181 lb 9.6 oz).    Physical Exam  GENERAL: alert and no distress  EYES: Eyes grossly normal to inspection, PERRL and conjunctivae and sclerae normal  HENT: ear canals and TM's normal, nose and mouth without ulcers or lesions  NECK: no adenopathy, no asymmetry, masses, or scars  RESP: lungs clear to auscultation - no rales, rhonchi or wheezes  CV: regular rate and rhythm, normal S1 S2, no S3 or S4, no murmur, click or rub, no peripheral edema  ABDOMEN: soft, nontender, no hepatosplenomegaly, no masses and bowel sounds normal  MS: no gross musculoskeletal defects noted, no edema  SKIN: no suspicious lesions or rashes  NEURO: Normal strength and tone, mentation intact and speech normal  PSYCH: mentation appears normal, affect normal/bright  LYMPH: no cervical, supraclavicular, axillary, or inguinal adenopathy        Signed Electronically by: Coty Alegre MD    "

## 2024-07-15 ENCOUNTER — MYC MEDICAL ADVICE (OUTPATIENT)
Dept: FAMILY MEDICINE | Facility: CLINIC | Age: 49
End: 2024-07-15
Payer: COMMERCIAL

## 2024-07-15 DIAGNOSIS — G25.81 RESTLESS LEGS SYNDROME (RLS): ICD-10-CM

## 2024-07-15 RX ORDER — ROPINIROLE 0.5 MG/1
TABLET, FILM COATED ORAL
Qty: 180 TABLET | Refills: 2 | Status: SHIPPED | OUTPATIENT
Start: 2024-07-15

## 2025-01-17 NOTE — TELEPHONE ENCOUNTER
April refill simvastatin sent to [facility] until mail order can reach patient.   Home with home PT for safety assessment, gait,stair negotiation, balance, & strength training and to return pt to baseline functional mobility status./yes

## 2025-03-23 SDOH — HEALTH STABILITY: PHYSICAL HEALTH: ON AVERAGE, HOW MANY DAYS PER WEEK DO YOU ENGAGE IN MODERATE TO STRENUOUS EXERCISE (LIKE A BRISK WALK)?: 4 DAYS

## 2025-03-23 SDOH — HEALTH STABILITY: PHYSICAL HEALTH: ON AVERAGE, HOW MANY MINUTES DO YOU ENGAGE IN EXERCISE AT THIS LEVEL?: 30 MIN

## 2025-03-23 ASSESSMENT — SOCIAL DETERMINANTS OF HEALTH (SDOH): HOW OFTEN DO YOU GET TOGETHER WITH FRIENDS OR RELATIVES?: PATIENT DECLINED

## 2025-03-24 ENCOUNTER — OFFICE VISIT (OUTPATIENT)
Dept: FAMILY MEDICINE | Facility: CLINIC | Age: 50
End: 2025-03-24
Payer: COMMERCIAL

## 2025-03-24 VITALS
SYSTOLIC BLOOD PRESSURE: 112 MMHG | HEIGHT: 69 IN | TEMPERATURE: 98.1 F | BODY MASS INDEX: 26.81 KG/M2 | DIASTOLIC BLOOD PRESSURE: 76 MMHG | OXYGEN SATURATION: 96 % | HEART RATE: 81 BPM | RESPIRATION RATE: 15 BRPM | WEIGHT: 181 LBS

## 2025-03-24 DIAGNOSIS — Z83.3 FAMILY HISTORY OF DIABETES MELLITUS: ICD-10-CM

## 2025-03-24 DIAGNOSIS — Z00.00 ROUTINE GENERAL MEDICAL EXAMINATION AT A HEALTH CARE FACILITY: Primary | ICD-10-CM

## 2025-03-24 DIAGNOSIS — K21.9 GASTROESOPHAGEAL REFLUX DISEASE WITHOUT ESOPHAGITIS: ICD-10-CM

## 2025-03-24 DIAGNOSIS — E78.5 HYPERLIPIDEMIA LDL GOAL <130: ICD-10-CM

## 2025-03-24 DIAGNOSIS — D64.9 ANEMIA, UNSPECIFIED TYPE: ICD-10-CM

## 2025-03-24 DIAGNOSIS — G25.81 RESTLESS LEGS SYNDROME (RLS): ICD-10-CM

## 2025-03-24 DIAGNOSIS — Z11.3 SCREEN FOR STD (SEXUALLY TRANSMITTED DISEASE): ICD-10-CM

## 2025-03-24 LAB
ALBUMIN SERPL BCG-MCNC: 4.4 G/DL (ref 3.5–5.2)
ALP SERPL-CCNC: 48 U/L (ref 40–150)
ALT SERPL W P-5'-P-CCNC: 24 U/L (ref 0–70)
ANION GAP SERPL CALCULATED.3IONS-SCNC: 10 MMOL/L (ref 7–15)
AST SERPL W P-5'-P-CCNC: 25 U/L (ref 0–45)
BILIRUB SERPL-MCNC: 0.3 MG/DL
BUN SERPL-MCNC: 19.2 MG/DL (ref 6–20)
C TRACH DNA SPEC QL NAA+PROBE: NEGATIVE
CALCIUM SERPL-MCNC: 9.3 MG/DL (ref 8.8–10.4)
CHLORIDE SERPL-SCNC: 106 MMOL/L (ref 98–107)
CHOLEST SERPL-MCNC: 165 MG/DL
CREAT SERPL-MCNC: 1.09 MG/DL (ref 0.67–1.17)
EGFRCR SERPLBLD CKD-EPI 2021: 83 ML/MIN/1.73M2
ERYTHROCYTE [DISTWIDTH] IN BLOOD BY AUTOMATED COUNT: 14.1 % (ref 10–15)
EST. AVERAGE GLUCOSE BLD GHB EST-MCNC: 120 MG/DL
FASTING STATUS PATIENT QL REPORTED: YES
FASTING STATUS PATIENT QL REPORTED: YES
FERRITIN SERPL-MCNC: 16 NG/ML (ref 31–409)
GLUCOSE SERPL-MCNC: 103 MG/DL (ref 70–99)
HBA1C MFR BLD: 5.8 % (ref 0–5.6)
HCO3 SERPL-SCNC: 27 MMOL/L (ref 22–29)
HCT VFR BLD AUTO: 37.4 % (ref 40–53)
HCV AB SERPL QL IA: NONREACTIVE
HDLC SERPL-MCNC: 51 MG/DL
HGB BLD-MCNC: 12.1 G/DL (ref 13.3–17.7)
HIV 1+2 AB+HIV1 P24 AG SERPL QL IA: NONREACTIVE
LDLC SERPL CALC-MCNC: 98 MG/DL
MCH RBC QN AUTO: 27.3 PG (ref 26.5–33)
MCHC RBC AUTO-ENTMCNC: 32.4 G/DL (ref 31.5–36.5)
MCV RBC AUTO: 84 FL (ref 78–100)
N GONORRHOEA DNA SPEC QL NAA+PROBE: NEGATIVE
NONHDLC SERPL-MCNC: 114 MG/DL
PLATELET # BLD AUTO: 320 10E3/UL (ref 150–450)
POTASSIUM SERPL-SCNC: 4.4 MMOL/L (ref 3.4–5.3)
PROT SERPL-MCNC: 7 G/DL (ref 6.4–8.3)
RBC # BLD AUTO: 4.44 10E6/UL (ref 4.4–5.9)
SODIUM SERPL-SCNC: 143 MMOL/L (ref 135–145)
SPECIMEN TYPE: NORMAL
SPECIMEN TYPE: NORMAL
T PALLIDUM AB SER QL: NONREACTIVE
TRIGL SERPL-MCNC: 80 MG/DL
WBC # BLD AUTO: 5.9 10E3/UL (ref 4–11)

## 2025-03-24 PROCEDURE — 87491 CHLMYD TRACH DNA AMP PROBE: CPT | Performed by: FAMILY MEDICINE

## 2025-03-24 PROCEDURE — 83036 HEMOGLOBIN GLYCOSYLATED A1C: CPT | Performed by: FAMILY MEDICINE

## 2025-03-24 PROCEDURE — 99396 PREV VISIT EST AGE 40-64: CPT | Performed by: FAMILY MEDICINE

## 2025-03-24 PROCEDURE — 99214 OFFICE O/P EST MOD 30 MIN: CPT | Mod: 25 | Performed by: FAMILY MEDICINE

## 2025-03-24 PROCEDURE — 3074F SYST BP LT 130 MM HG: CPT | Performed by: FAMILY MEDICINE

## 2025-03-24 PROCEDURE — 80061 LIPID PANEL: CPT | Performed by: FAMILY MEDICINE

## 2025-03-24 PROCEDURE — 80053 COMPREHEN METABOLIC PANEL: CPT | Performed by: FAMILY MEDICINE

## 2025-03-24 PROCEDURE — 87591 N.GONORRHOEAE DNA AMP PROB: CPT | Performed by: FAMILY MEDICINE

## 2025-03-24 PROCEDURE — 85027 COMPLETE CBC AUTOMATED: CPT | Performed by: FAMILY MEDICINE

## 2025-03-24 PROCEDURE — 82728 ASSAY OF FERRITIN: CPT | Performed by: FAMILY MEDICINE

## 2025-03-24 PROCEDURE — 86780 TREPONEMA PALLIDUM: CPT | Performed by: FAMILY MEDICINE

## 2025-03-24 PROCEDURE — 86803 HEPATITIS C AB TEST: CPT | Performed by: FAMILY MEDICINE

## 2025-03-24 PROCEDURE — 87389 HIV-1 AG W/HIV-1&-2 AB AG IA: CPT | Performed by: FAMILY MEDICINE

## 2025-03-24 PROCEDURE — 3078F DIAST BP <80 MM HG: CPT | Performed by: FAMILY MEDICINE

## 2025-03-24 RX ORDER — SIMVASTATIN 20 MG
20 TABLET ORAL AT BEDTIME
Qty: 90 TABLET | Refills: 4 | Status: CANCELLED | OUTPATIENT
Start: 2025-03-24

## 2025-03-24 RX ORDER — FERROUS SULFATE 325(65) MG
325 TABLET ORAL EVERY OTHER DAY
Qty: 100 TABLET | Refills: 2 | Status: SHIPPED | OUTPATIENT
Start: 2025-03-24

## 2025-03-24 RX ORDER — ROPINIROLE 1 MG/1
2 TABLET, FILM COATED ORAL AT BEDTIME
Qty: 180 TABLET | Refills: 3 | Status: SHIPPED | OUTPATIENT
Start: 2025-03-24

## 2025-03-24 RX ORDER — ROSUVASTATIN CALCIUM 10 MG/1
10 TABLET, COATED ORAL DAILY
Qty: 90 TABLET | Refills: 4 | Status: SHIPPED | OUTPATIENT
Start: 2025-03-24

## 2025-03-24 RX ORDER — OMEPRAZOLE 20 MG/1
40 CAPSULE, DELAYED RELEASE ORAL DAILY
Qty: 180 CAPSULE | Refills: 4 | Status: SHIPPED | OUTPATIENT
Start: 2025-03-24

## 2025-03-24 NOTE — PROGRESS NOTES
"Preventive Care Visit  [facility]  Coty Alegre MD, Family Medicine  Mar 24, 2025      Assessment & Plan     Routine general medical examination at a health care facility    - REVIEW OF HEALTH MAINTENANCE PROTOCOL ORDERS    Hyperlipidemia LDL goal <130  Under control  Will switch to rosuvastatin due to less drug interactions    - rosuvastatin (CRESTOR) 10 MG tablet  Dispense: 90 tablet; Refill: 4    Restless legs syndrome (RLS)  Will increase dose a tad   Refills per epicare    - rOPINIRole (REQUIP) 1 MG tablet  Dispense: 180 tablet; Refill: 3    Gastroesophageal reflux disease without esophagitis  Doing well  Refills per epicare    - omeprazole (PRILOSEC) 20 MG DR capsule  Dispense: 180 capsule; Refill: 4      Patient has been advised of split billing requirements and indicates understanding: Yes        BMI  Estimated body mass index is 26.73 kg/m  as calculated from the following:    Height as of this encounter: 1.753 m (5' 9\").    Weight as of this encounter: 82.1 kg (181 lb).   Weight management plan: Discussed healthy diet and exercise guidelines    Counseling  Appropriate preventive services were addressed with this patient via screening, questionnaire, or discussion as appropriate for fall prevention, nutrition, physical activity, Tobacco-use cessation, social engagement, weight loss and cognition.  Checklist reviewing preventive services available has been given to the patient.  Reviewed patient's diet, addressing concerns and/or questions.       FUTURE APPOINTMENTS:       - Follow-up visit in one year for physical   Regular exercise  See Patient Instructions    Jasbir Falcon is a 50 year old, presenting for the following:  Physical (Labs and med refills/ patient is fasting for labs, /Patient would like to discuss restless legs, it is becoming worse )  His RLS is getting a little worse and the requip is not working great anymore.   He wonders if he should have new drug or " higher dose.    He is also on statin for his lipids.    He is not having any side effects on this.    He would like to get refills on her PPI for gastroesophageal reflux disease.      He is considering moving to [place].   He and his  have visited often and really like it.         3/24/2025     9:01 AM   Additional Questions   Roomed by Kandice CHAPARRO       See above   Advance Care Planning  Patient does not have a Health Care Directive: discussed        3/23/2025   General Health   How would you rate your overall physical health? Good   Feel stress (tense, anxious, or unable to sleep) Not at all         3/23/2025   Nutrition   Three or more servings of calcium each day? Yes   Diet: Regular (no restrictions)   How many servings of fruit and vegetables per day? (!) 2-3   How many sweetened beverages each day? 0-1         3/23/2025   Exercise   Days per week of moderate/strenous exercise 4 days   Average minutes spent exercising at this level 30 min         3/23/2025   Social Factors   Frequency of gathering with friends or relatives Patient declined   Worry food won't last until get money to buy more No   Food not last or not have enough money for food? No   Do you have housing? (Housing is defined as stable permanent housing and does not include staying ouside in a car, in a tent, in an abandoned building, in an overnight shelter, or couch-surfing.) Yes   Are you worried about losing your housing? No   Lack of transportation? No   Unable to get utilities (heat,electricity)? No         3/23/2025   Dental   Dentist two times every year? (!) DECLINE           5/29/2024   TB Screening   Were you born outside of the [place]? No           Today's PHQ-2 Score:       3/23/2025    10:01 AM   PHQ-2 ( 1999 [employer])   Q1: Little interest or pleasure in doing things 0   Q2: Feeling down, depressed or hopeless 0   PHQ-2 Score 0    Q1: Little interest or pleasure in doing things Not at all   Q2: Feeling down, depressed  or hopeless Not at all   PHQ-2 Score 0       Patient-reported           3/23/2025   Substance Use   Alcohol more than 3/day or more than 7/wk No   Do you use any other substances recreationally? (!) CANNABIS PRODUCTS     Social History     Tobacco Use    Smoking status: Never    Smokeless tobacco: Never    Tobacco comments:     vapes - rarely   Vaping Use    Vaping status: Every Day    Substances: THC    Devices: Disposable   Substance Use Topics    Alcohol use: Yes     Comment: RARELY    Drug use: Yes     Types: Marijuana     Comment: 2-3x week           3/23/2025   STI Screening   New sexual partner(s) since last STI/HIV test? No   ASCVD Risk   The 10-year ASCVD risk score ([place] QUINONES, et al., 2019) is: 2.5%    Values used to calculate the score:      Age: 50 years      Sex: Male      Is Non- : No      Diabetic: No      Tobacco smoker: No      Systolic Blood Pressure: 112 mmHg      Is BP treated: No      HDL Cholesterol: 41 mg/dL      Total Cholesterol: 173 mg/dL        3/23/2025   Contraception/Family Planning   Questions about contraception or family planning No        Reviewed and updated as needed this visit by Provider                    Labs reviewed in EPIC  BP Readings from Last 3 Encounters:   03/24/25 112/76   05/29/24 133/74   07/26/23 114/73    Wt Readings from Last 3 Encounters:   03/24/25 82.1 kg (181 lb)   05/29/24 82.4 kg (181 lb 9.6 oz)   07/26/23 80.3 kg (177 lb)                  Patient Active Problem List   Diagnosis    Spasm of muscle    Esophageal reflux    Family history of diabetes mellitus    Allergic rhinitis    Hyperlipidemia LDL goal <130    Restless legs syndrome (RLS)    Lipoma of skin and subcutaneous tissue    Dermatitis    Chafing    External hemorrhoids    Thrombosis of superficial vein of penis    Peyronie disease     Past Surgical History:   Procedure Laterality Date    BIOPSY  6/22/2015    Mole, right calf    COLONOSCOPY N/A 09/03/2021    rpt in  10 years    ESOPHAGOSCOPY, GASTROSCOPY, DUODENOSCOPY (EGD), COMBINED N/A 09/03/2021    recheck in 5-10 years (EGD);  Surgeon: Tayler Vera MD;  Location:  GI    SURGICAL HISTORY OF -   1994    fractured mandible    SURGICAL HISTORY OF -   04/2013    left eye lazer surgery for detached retina       Social History     Tobacco Use    Smoking status: Never    Smokeless tobacco: Never    Tobacco comments:     vapes - rarely   Substance Use Topics    Alcohol use: Yes     Comment: RARELY     Family History   Problem Relation Age of Onset    Lipids Mother     Neurologic Disorder Mother         multiple sclerosis    Hyperlipidemia Mother     Lipids Father     Genitourinary Problems Father         kidney stone    Thyroid Disease Father         hyperthyroidism    Nephrolithiasis Father     Hyperlipidemia Father     Hypertension Maternal Grandmother     Gastrointestinal Disease Maternal Grandmother         diverticulitis    Other Cancer Maternal Grandmother 96        blood cancer with kidney failure    Diabetes Type 2  Maternal Grandmother     Hypertension Maternal Grandfather     Diabetes Maternal Grandfather     C.A.D. Paternal Grandfather         in his 60's    Diabetes Paternal Grandfather     Other Cancer Other         Aunt - head and neck cancer    Diabetes Type 2  Other     Colon Cancer No family hx of          Current Outpatient Medications   Medication Sig Dispense Refill    fluticasone (FLONASE) 50 MCG/ACT nasal spray Spray 1 spray into both nostrils daily      omeprazole (PRILOSEC) 20 MG DR capsule Take 2 capsules (40 mg) by mouth daily. 180 capsule 4    rOPINIRole (REQUIP) 1 MG tablet Take 2 tablets (2 mg) by mouth at bedtime. TAKE 2 TABLETS NIGHTLY AS NEEDED 180 tablet 3    rosuvastatin (CRESTOR) 10 MG tablet Take 1 tablet (10 mg) by mouth daily. 90 tablet 4    simvastatin (ZOCOR) 20 MG tablet Take 1 tablet (20 mg) by mouth at bedtime 90 tablet 4     Allergies   Allergen Reactions    No Known Drug  "Allergy     Seasonal Allergies          Review of Systems  Constitutional, HEENT, cardiovascular, pulmonary, gi and gu systems are negative, except as otherwise noted.     Objective    Exam  /76 (BP Location: Right arm, Patient Position: Sitting, Cuff Size: Adult Large)   Pulse 81   Temp 98.1  F (36.7  C) (Oral)   Resp 15   Ht 1.753 m (5' 9\")   Wt 82.1 kg (181 lb)   SpO2 96%   BMI 26.73 kg/m     Estimated body mass index is 26.73 kg/m  as calculated from the following:    Height as of this encounter: 1.753 m (5' 9\").    Weight as of this encounter: 82.1 kg (181 lb).    Physical Exam  GENERAL: alert and no distress  EYES: Eyes grossly normal to inspection, PERRL and conjunctivae and sclerae normal  HENT: ear canals and TM's normal, nose and mouth without ulcers or lesions  NECK: no adenopathy, no asymmetry, masses, or scars  RESP: lungs clear to auscultation - no rales, rhonchi or wheezes  CV: regular rate and rhythm, normal S1 S2, no S3 or S4, no murmur, click or rub, no peripheral edema  ABDOMEN: soft, nontender, no hepatosplenomegaly, no masses and bowel sounds normal  MS: no gross musculoskeletal defects noted, no edema  SKIN: no suspicious lesions or rashes  NEURO: Normal strength and tone, mentation intact and speech normal  PSYCH: mentation appears normal, affect normal/bright  LYMPH: no cervical, supraclavicular, axillary, or inguinal adenopathy        Signed Electronically by: Coty Alegre MD    "

## 2025-03-24 NOTE — PATIENT INSTRUCTIONS
Patient Education   Preventive Care Advice   This is general advice given by our system to help you stay healthy. However, your care team may have specific advice just for you. Please talk to your care team about your preventive care needs.  Nutrition  Eat 5 or more servings of fruits and vegetables each day.  Try wheat bread, brown rice and whole grain pasta (instead of white bread, rice, and pasta).  Get enough calcium and vitamin D. Check the label on foods and aim for 100% of the RDA (recommended daily allowance).  Lifestyle  Exercise at least 150 minutes each week  (30 minutes a day, 5 days a week).  Do muscle strengthening activities 2 days a week. These help control your weight and prevent disease.  No smoking.  Wear sunscreen to prevent skin cancer.  Have a dental exam and cleaning every 6 months.  Yearly exams  See your health care team every year to talk about:  Any changes in your health.  Any medicines your care team has prescribed.  Preventive care, family planning, and ways to prevent chronic diseases.  Shots (vaccines)   HPV shots (up to age 26), if you've never had them before.  Hepatitis B shots (up to age 59), if you've never had them before.  COVID-19 shot: Get this shot when it's due.  Flu shot: Get a flu shot every year.  Tetanus shot: Get a tetanus shot every 10 years.  Pneumococcal, hepatitis A, and RSV shots: Ask your care team if you need these based on your risk.  Shingles shot (for age 50 and up)  General health tests  Diabetes screening:  Starting at age 35, Get screened for diabetes at least every 3 years.  If you are younger than age 35, ask your care team if you should be screened for diabetes.  Cholesterol test: At age 39, start having a cholesterol test every 5 years, or more often if advised.  Bone density scan (DEXA): At age 50, ask your care team if you should have this scan for osteoporosis (brittle bones).  Hepatitis C: Get tested at least once in your life.  STIs (sexually  transmitted infections)  Before age 24: Ask your care team if you should be screened for STIs.  After age 24: Get screened for STIs if you're at risk. You are at risk for STIs (including HIV) if:  You are sexually active with more than one person.  You don't use condoms every time.  You or a partner was diagnosed with a sexually transmitted infection.  If you are at risk for HIV, ask about PrEP medicine to prevent HIV.  Get tested for HIV at least once in your life, whether you are at risk for HIV or not.  Cancer screening tests  Cervical cancer screening: If you have a cervix, begin getting regular cervical cancer screening tests starting at age 21.  Breast cancer scan (mammogram): If you've ever had breasts, begin having regular mammograms starting at age 40. This is a scan to check for breast cancer.  Colon cancer screening: It is important to start screening for colon cancer at age 45.  Have a colonoscopy test every 10 years (or more often if you're at risk) Or, ask your provider about stool tests like a FIT test every year or Cologuard test every 3 years.  To learn more about your testing options, visit:   .  For help making a decision, visit:   https://bit.ly/qo05718.  Prostate cancer screening test: If you have a prostate, ask your care team if a prostate cancer screening test (PSA) at age 55 is right for you.  Lung cancer screening: If you are a current or former smoker ages 50 to 80, ask your care team if ongoing lung cancer screenings are right for you.  For informational purposes only. Not to replace the advice of your health care provider. Copyright   2023 [facility]. All rights reserved. Clinically reviewed by the [facility] Transitions Program. [place] [id number] - REV 01/24.  Substance Use Disorder: Care Instructions  Overview     You can improve your life and health by stopping your use of alcohol or drugs. When you don't drink or use drugs, you may feel and sleep better. You may  get along better with your family, friends, and coworkers. There are medicines and programs that can help with substance use disorder.  How can you care for yourself at home?  Here are some ways to help you stay sober and prevent relapse.  If you have been given medicine to help keep you sober or reduce your cravings, be sure to take it exactly as prescribed.  Talk to your doctor about programs that can help you stop using drugs or drinking alcohol.  Do not keep alcohol or drugs in your home.  Plan ahead. Think about what you'll say if other people ask you to drink or use drugs. Try not to spend time with people who drink or use drugs.  Use the time and money spent on drinking or drugs to do something that's important to you.  Preventing a relapse  Have a plan to deal with relapse. Learn to recognize changes in your thinking that lead you to drink or use drugs. Get help before you start to drink or use drugs again.  Try to stay away from situations, friends, or places that may lead you to drink or use drugs.  If you feel the need to drink alcohol or use drugs again, seek help right away. Call a trusted friend or family member. Some people get support from organizations such as Narcotics Anonymous or [employer] or from treatment facilities.  If you relapse, get help as soon as you can. Some people make a plan with another person that outlines what they want that person to do for them if they relapse. The plan usually includes how to handle the relapse and who to notify in case of relapse.  Don't give up. Remember that a relapse doesn't mean that you have failed. Use the experience to learn the triggers that lead you to drink or use drugs. Then quit again. Recovery is a lifelong process. Many people have several relapses before they are able to quit for good.  Follow-up care is a key part of your treatment and safety. Be sure to make and go to all appointments, and call your doctor if you are having problems. It's  "also a good idea to know your test results and keep a list of the medicines you take.  When should you call for help?   Call 911  anytime you think you may need emergency care. For example, call if you or someone else:    Has overdosed or has withdrawal signs. Be sure to tell the emergency workers that you are or someone else is using or trying to quit using drugs. Overdose or withdrawal signs may include:  Losing consciousness.  Seizure.  Seeing or hearing things that aren't there (hallucinations).     Is thinking or talking about suicide or harming others.   Where to get help 24 hours a day, 7 days a week   If you or someone you know talks about suicide, self-harm, a mental health crisis, a substance use crisis, or any other kind of emotional distress, get help right away. You can:    Call the Suicide and Crisis Lifeline at 988.     Call 7-162-831-TALK (1-624.361.2164).     Text HOME to 412360 to access the Crisis Text Line.   Consider saving these numbers in your phone.  Go to [employer] for more information or to chat online.  Call your doctor now or seek immediate medical care if:    You are having withdrawal symptoms. These may include nausea or vomiting, sweating, shakiness, and anxiety.   Watch closely for changes in your health, and be sure to contact your doctor if:    You have a relapse.     You need more help or support to stop.   Where can you learn more?  Go to https://www.[employer].net/patiented  Enter H573 in the search box to learn more about \"Substance Use Disorder: Care Instructions.\"  Current as of: August 20, 2024  Content Version: 14.4    8780-7309 [employer].   Care instructions adapted under license by your healthcare professional. If you have questions about a medical condition or this instruction, always ask your healthcare professional. [employer] disclaims any warranty or liability for your use of this information.       "

## 2025-04-29 DIAGNOSIS — K21.9 GASTROESOPHAGEAL REFLUX DISEASE WITHOUT ESOPHAGITIS: ICD-10-CM

## 2025-04-29 NOTE — TELEPHONE ENCOUNTER
Received a call from the patient   - Patient requesting a refill of his omeprazole (PRILOSEC) 20 MG DR capsule medication   - Patient states that he has refills on file, but it states that it is on hold, so he is unable to fill the medication     omeprazole (PRILOSEC) 20 MG DR capsule 180 capsule 4 3/24/2025 -- No   Sig - Route: Take 2 capsules (40 mg) by mouth daily. - Oral     Dr. Alegre, please review and reorder the patient's omeprazole (PRILOSEC) 20 MG DR capsule medication to [facility] - [place], [place] - [address] [place] [address] as they will not fill this medication as they report that it is on hold.     April ISIDRO RN   [facility]

## 2025-04-30 RX ORDER — OMEPRAZOLE 20 MG/1
40 CAPSULE, DELAYED RELEASE ORAL DAILY
Qty: 180 CAPSULE | Refills: 4 | Status: SHIPPED | OUTPATIENT
Start: 2025-04-30

## (undated) DEVICE — KIT ENDO TURNOVER/PROCEDURE W/CLEAN A SCOPE LINERS 103888

## (undated) RX ORDER — SIMETHICONE 40MG/0.6ML
SUSPENSION, DROPS(FINAL DOSAGE FORM)(ML) ORAL
Status: DISPENSED
Start: 2021-09-03